# Patient Record
Sex: FEMALE | Race: WHITE
[De-identification: names, ages, dates, MRNs, and addresses within clinical notes are randomized per-mention and may not be internally consistent; named-entity substitution may affect disease eponyms.]

---

## 2022-12-16 ENCOUNTER — HOSPITAL ENCOUNTER (EMERGENCY)
Dept: HOSPITAL 47 - EC | Age: 87
Discharge: ANOTHER HOSPITAL | End: 2022-12-16
Payer: MEDICARE

## 2022-12-16 VITALS — HEART RATE: 60 BPM | RESPIRATION RATE: 18 BRPM | DIASTOLIC BLOOD PRESSURE: 58 MMHG | SYSTOLIC BLOOD PRESSURE: 91 MMHG

## 2022-12-16 DIAGNOSIS — R29.711: ICD-10-CM

## 2022-12-16 DIAGNOSIS — I10: ICD-10-CM

## 2022-12-16 DIAGNOSIS — E11.9: ICD-10-CM

## 2022-12-16 DIAGNOSIS — I63.9: Primary | ICD-10-CM

## 2022-12-16 LAB
ALBUMIN SERPL-MCNC: 4.5 G/DL (ref 3.5–5)
ALP SERPL-CCNC: 81 U/L (ref 38–126)
ALT SERPL-CCNC: 16 U/L (ref 4–34)
ANION GAP SERPL CALC-SCNC: 13 MMOL/L
APTT BLD: 21.4 SEC (ref 22–30)
AST SERPL-CCNC: 21 U/L (ref 14–36)
BASOPHILS # BLD AUTO: 0 K/UL (ref 0–0.2)
BASOPHILS NFR BLD AUTO: 1 %
BUN SERPL-SCNC: 21 MG/DL (ref 7–17)
CALCIUM SPEC-MCNC: 10 MG/DL (ref 8.4–10.2)
CHLORIDE SERPL-SCNC: 108 MMOL/L (ref 98–107)
CO2 SERPL-SCNC: 19 MMOL/L (ref 22–30)
EOSINOPHIL # BLD AUTO: 0.1 K/UL (ref 0–0.7)
EOSINOPHIL NFR BLD AUTO: 3 %
ERYTHROCYTE [DISTWIDTH] IN BLOOD BY AUTOMATED COUNT: 4.78 M/UL (ref 3.8–5.4)
ERYTHROCYTE [DISTWIDTH] IN BLOOD: 12.2 % (ref 11.5–15.5)
GLUCOSE SERPL-MCNC: 132 MG/DL (ref 74–99)
HCT VFR BLD AUTO: 45.1 % (ref 34–46)
HGB BLD-MCNC: 15.6 GM/DL (ref 11.4–16)
INR PPP: 0.9 (ref ?–1.2)
LYMPHOCYTES # SPEC AUTO: 1.7 K/UL (ref 1–4.8)
LYMPHOCYTES NFR SPEC AUTO: 35 %
MCH RBC QN AUTO: 32.7 PG (ref 25–35)
MCHC RBC AUTO-ENTMCNC: 34.7 G/DL (ref 31–37)
MCV RBC AUTO: 94.4 FL (ref 80–100)
MONOCYTES # BLD AUTO: 0.3 K/UL (ref 0–1)
MONOCYTES NFR BLD AUTO: 6 %
NEUTROPHILS # BLD AUTO: 2.6 K/UL (ref 1.3–7.7)
NEUTROPHILS NFR BLD AUTO: 52 %
PLATELET # BLD AUTO: 229 K/UL (ref 150–450)
POTASSIUM SERPL-SCNC: 4 MMOL/L (ref 3.5–5.1)
PROT SERPL-MCNC: 6.8 G/DL (ref 6.3–8.2)
PT BLD: 10 SEC (ref 9–12)
SODIUM SERPL-SCNC: 140 MMOL/L (ref 137–145)
WBC # BLD AUTO: 4.9 K/UL (ref 3.8–10.6)

## 2022-12-16 PROCEDURE — 93005 ELECTROCARDIOGRAM TRACING: CPT

## 2022-12-16 PROCEDURE — 70496 CT ANGIOGRAPHY HEAD: CPT

## 2022-12-16 PROCEDURE — 96374 THER/PROPH/DIAG INJ IV PUSH: CPT

## 2022-12-16 PROCEDURE — 83605 ASSAY OF LACTIC ACID: CPT

## 2022-12-16 PROCEDURE — 99291 CRITICAL CARE FIRST HOUR: CPT

## 2022-12-16 PROCEDURE — 70450 CT HEAD/BRAIN W/O DYE: CPT

## 2022-12-16 PROCEDURE — 80053 COMPREHEN METABOLIC PANEL: CPT

## 2022-12-16 PROCEDURE — 96375 TX/PRO/DX INJ NEW DRUG ADDON: CPT

## 2022-12-16 PROCEDURE — 84484 ASSAY OF TROPONIN QUANT: CPT

## 2022-12-16 PROCEDURE — 36415 COLL VENOUS BLD VENIPUNCTURE: CPT

## 2022-12-16 PROCEDURE — 85610 PROTHROMBIN TIME: CPT

## 2022-12-16 PROCEDURE — 37195 THROMBOLYTIC THERAPY STROKE: CPT

## 2022-12-16 PROCEDURE — 85025 COMPLETE CBC W/AUTO DIFF WBC: CPT

## 2022-12-16 PROCEDURE — 71045 X-RAY EXAM CHEST 1 VIEW: CPT

## 2022-12-16 PROCEDURE — 70498 CT ANGIOGRAPHY NECK: CPT

## 2022-12-16 PROCEDURE — 85730 THROMBOPLASTIN TIME PARTIAL: CPT

## 2022-12-16 NOTE — CT
EXAMINATION TYPE: CT angio head neck

CT DLP: 408.3 mGycm, Automated exposure control for dose reduction was used.

 

DATE OF EXAM: 12/16/2022 5:50 PM

 

COMPARISON: CT head same day..  

 

CLINICAL INDICATION:Female, 87 years old with history of Neuro deficit, acute, stroke suspected; 

 

TECHNIQUE: Axially acquired helical CT angiogram of the head and neck was obtained with contrast. Axi
al images are supplemented with 3D reconstructions which were post-processed at an independent workst
ation. NASCET criteria used.

Contrast used:65 mL of Isovue 370 with IV Contrast, 

Oral contrast used: None. 

 

FINDINGS:

 

CTA HEAD:

No evidence of acute intracranial hemorrhage, mass effect, or midline shift. The ventricles, sulci, a
nd cisterns are unremarkable. 

 

The visualized portions of the internal carotid arteries, right middle cerebral arteries, anterior ce
rebral arteries, and posterior cerebral arteries are patent. 

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 Series 409 image 25.

Fetal origin of the right posterior cerebral artery. Atherosclerosis of the internal carotid arteries
 intracranial portions without significant stenosis.

 

The basilar and vertebral arteries are patent. 

 

CTA NECK:

Right Carotid System: 

The common carotid artery and external carotid artery are patent. The carotid bifurcation demonstrate
s calcified plaque without evidence of hemodynamically significant stenosis. The remaining portions o
f the internal carotid artery demonstrate normal size without significant narrowing.

 

Left Carotid System: 

The common carotid artery and external carotid artery are patent. The carotid bifurcation demonstrate
s calcified plaque without evidence of hemodynamically significant stenosis. The remaining portions o
f the internal carotid artery demonstrate normal size without significant narrowing.

 

Vertebral arteries are patent nares calcified plaque at the origins of the vertebral arteries bloomin
g artifact limits evaluation

 

There is a two-vessel aortic arch, left subclavian artery demonstrates predominately calcified plaque
 at its origin extending along a length of 2.1 cm with 70-95% stenosis. 

 

Findings communicated to Dr. Scooter Holden MD  on 12/16/2022 6:22 PM by Dr. Gil Diamond.

 

IMPRESSION:

1.  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 

2.  No evidence of dissection of the cervical internal carotid arteries or any evidence of significan
t stenosis at the carotid bifurcations. 

3.  No evidence of or intracranial aneurysm. 

4.  Left subclavian artery demonstrates predominately calcified plaque at its origin extending along 
the length of 2.1 cm with 70-95% stenosis. 

5.  Calcified plaque at the origins of the vertebral arteries left greater than right, difficult to e
valuate given blooming artifact from the calcifications.

## 2022-12-16 NOTE — XR
EXAMINATION TYPE: XR chest 1V portable

 

DATE OF EXAM: 12/16/2022 7:29 PM

 

COMPARISON: Chest radiographs from 5/18/2012

 

TECHNIQUE: XR chest 1V portable Portable AP radiograph of the chest.

 

CLINICAL INDICATION:Female, 87 years old with history of altered mental status; 

 

FINDINGS: 

Lungs/Pleura: There is no evidence of pleural effusion, focal consolidation, or pneumothorax.  

Pulmonary vascularity: Unremarkable.

Heart/mediastinum: Cardiomediastinal silhouette is enlarged and stable.

Musculoskeletal: No acute osseous pathology. Midline sternotomy wires are noted.

 

 

IMPRESSION: 

No acute cardiopulmonary disease/process.

## 2022-12-16 NOTE — ED
General Adult HPI





- General


Stated complaint: poss stroke


Time Seen by Provider: 12/16/22 17:21





- History of Present Illness


Initial comments: 





Patient is an 87-year-old female with past medical history remarkable for 

hypertension, diabetes, prior MIs who presents emergency Department over concern

for possible stroke.  Last known well was 1:30 PM this afternoon.  Patient 

cannot provide any history.  Patient's daughter does present with the patient.  

They did have a full conversation with her on the phone at approximately 1:30pm.

 They arrived to her house, she was able to communicate, and was acting 

differently.  Normally patient is alert and oriented 4.  Normal she acts 

normally.  They called EMS immediately.  She presents emergency department at 

approximately 5:20 PM which is when I evaluated her.  She is unable to provide 

any history.  She is alert and looking around but confused.  Not making any 

sense when speaking, and cannot follow any commands.  Is moving all 4 

extremities.  Presents for further evaluation at this time.  No history of blood

thinner use.  No history of intracranial hemorrhage or trauma.  No prior 

strokes.





- Related Data


                                    Allergies











Allergy/AdvReac Type Severity Reaction Status Date / Time


 


No Known Allergies Allergy   Verified 12/16/22 18:33














Review of Systems


ROS Statement: 


Those systems with pertinent positive or pertinent negative responses have been 

documented in the HPI.





ROS Other: All systems not noted in ROS Statement are negative.





General Exam





- General Exam Comments


Initial Comments: 





General: Appears in no acute distress.


HEAD:  Normal with no signs of head trauma.


EYES:  PERRLA, EOMI, conjunctiva normal, no discharge.  Pupils are 3 mm and 

equal bilaterally.


ENT:  Hearing grossly intact, normal oropharynx.


RESPIRATORY:  Clear breath sounds bilaterally.  No wheezes, rales, or rhonchi.  


C/V:  Regular rate and rhythm. S1 and S2 auscultated, no edema, peripheral 

pulses 2+ and intact throughout


ABD:  Abd is soft, nontender, nondistended


EXT: Normal range of motion, no obvious deformity


SKIN:  No rashes or lesions observed on exposed skin.


NEURO: Alert but not oriented.  Aphasic.  Somewhat mute at this time.  Moving 

all 4 extremities.  NIH is 10-11, 3 points for aphasia, 2 points for failing to 

following commands, and 2 points for being unable to respond to month and age, 2

points for dysarthria, and 1-2 points for inattention/hemineglect to the right 

side .  Last known well was 13:30.  GCS of 15.





Course


                                   Vital Signs











  12/16/22 12/16/22 12/16/22





  17:45 17:56 18:08


 


Pulse Rate 55 L 55 L 58 L


 


Respiratory 18 18 18





Rate   


 


Blood Pressure 188/68 152/108 73/49


 


O2 Sat by Pulse 99 99 99





Oximetry   














  12/16/22 12/16/22 12/16/22





  18:10 18:15 18:21


 


Pulse Rate 56 L 59 L 57 L


 


Respiratory 18 18 18





Rate   


 


Blood Pressure 95/61 94/67 110/98


 


O2 Sat by Pulse 98 99 99





Oximetry   














  12/16/22 12/16/22 12/16/22





  18:28 18:37 18:42


 


Pulse Rate 55 L 56 L 65


 


Respiratory 18 18 18





Rate   


 


Blood Pressure 79/50 108/92 119/67


 


O2 Sat by Pulse 99 98 98





Oximetry   














  12/16/22 12/16/22 12/16/22





  18:45 18:50 18:55


 


Pulse Rate 58 L 59 L 56 L


 


Respiratory 16 18 18





Rate   


 


Blood Pressure 92/70 101/75 147/132


 


O2 Sat by Pulse 97 97 97





Oximetry   














  12/16/22 12/16/22





  19:00 19:13


 


Pulse Rate 61 60


 


Respiratory 16 18





Rate  


 


Blood Pressure 94/66 91/58


 


O2 Sat by Pulse 96 98





Oximetry  














Medical Decision Making





- Medical Decision Making





Based on the patient's presentation and physical exam, I'm concerned for a code 

stroke.  Patient was made a code alteplase his last known well was within the 

last 4-1/2 hours.  Last known well was at 1330.  Point-of-care blood sugar is 

within normal limits.  NIH is 11.  Normal baseline is alert and oriented 4, and

independent with ADL's.  We will obtain stroke workup.  Code alteplase was 

activated of over head.  We will obtain stroke labs, as well as imaging.  The 

neuro interventionalist Dr. Newton on-call was notified.  He called back at 

[phone].  We did discuss the patient.  We do believe she is a TPA candidate, as 

long as family understands the increased risk of bleeding as the patient is 

greater than 80 years old.  This is a relative contraindication.  Otherwise he 

was in agreement with the plan.  While I was on the phone with him, he did 

interpret the CT brain without contrast, and saw no intracranial bleed.  CT of 

the brain as interpreted myself without contrast reveals no acute intracranial 

hemorrhage.  I do concur with his findings.  TPA was ordered at this time.





I was able to speak with the patient's son Eugene Vega who eventually 

presented at bedside.  We did discuss risks and benefits of TPA, as well as the 

patient's relative contraindication due to her age.  They expressed 

understanding, and as the patient is independent with her ADLs, and normal 

function and she is significantly below her baseline as she is unable to 

communicate at this time with a high NIH, they would like TPA administered.  

Patient's son, signed consent.  He did express understanding of the risks 

involved including possible bleed and death.  Patient was unable to consent on 

her own to her current clinical status.





Patient's blood pressure did increase while she was in the trauma bay from 

initial presentation.  She was administered 20 mg of IV labetalol.  This did 

decrease her blood pressure to 152/108 which is within acceptable limits for TPA

administration.  TPA was administered at 1757, still within the 4-1/2 hour 

window.  Nicardipine drip was ordered to be on standby in the event that patient

requires better blood pressure control. Goal BP will be SBP <185 and DBP <110.





I was notified by Dr. Diamond that the patient has a left MCA occlusion on CT 

angiogram.  I did reach back out to  at this time. Initially contacted 

at 1825. 





EKG shows no signs of acute ischemia.Patient's laboratory studies are remarkable

for slightly elevated lactic acid of 2.4.  Patient is a slight STACEY as well.  1 L

fluid bolus will be started.





On reevaluation, patient's blood pressure remains within acceptable limits.  

Patient is moving around as she is anxious, which does make her blood pressure 

readings intermittent but when we do hold her arm steady, patient systolics 

ranged between 94 and 120 and diastolics ranged between 66 and 75.





Cardene drip is being held at this time but is hung in the event the patient 

does require it.





Dr. Newton called back at 1851, and reviewed the images.  As the patient is 

having no significant improvement in her NIH, patient is full code, and is 

normally very independent with her ADLs, ambulates with a cane at baseline.  We 

discussed with family and patient is a thrombectomy candidate.  Patient will be 

transferred to [facility] in serious condition for thrombectomy 

evaluation.  Dr. Newton who accepted the patient. I spoke with Dr. Jackson at 

[facility] who accepted the transfer.





I instructed EMS the parameters for starting nicardipine for blood pressure 

systolics greater than 185 or/and diastolics greater than 110.





I discussed the case fully with family members who were in agreement with the 

plan. Patient was transferred in serious condition.





- Lab Data


Result diagrams: 


                                 12/16/22 18:08





                                 12/16/22 18:08


                                   Lab Results











  12/16/22 12/16/22 12/16/22 Range/Units





  18:08 18:08 18:08 


 


WBC  4.9    (3.8-10.6)  k/uL


 


RBC  4.78    (3.80-5.40)  m/uL


 


Hgb  15.6    (11.4-16.0)  gm/dL


 


Hct  45.1    (34.0-46.0)  %


 


MCV  94.4    (80.0-100.0)  fL


 


MCH  32.7    (25.0-35.0)  pg


 


MCHC  34.7    (31.0-37.0)  g/dL


 


RDW  12.2    (11.5-15.5)  %


 


Plt Count  229    (150-450)  k/uL


 


MPV  7.3    


 


Neutrophils %  52    %


 


Lymphocytes %  35    %


 


Monocytes %  6    %


 


Eosinophils %  3    %


 


Basophils %  1    %


 


Neutrophils #  2.6    (1.3-7.7)  k/uL


 


Lymphocytes #  1.7    (1.0-4.8)  k/uL


 


Monocytes #  0.3    (0-1.0)  k/uL


 


Eosinophils #  0.1    (0-0.7)  k/uL


 


Basophils #  0.0    (0-0.2)  k/uL


 


PT   10.0   (9.0-12.0)  sec


 


INR   0.9   (<1.2)  


 


APTT   21.4 L   (22.0-30.0)  sec


 


Sodium    140  (137-145)  mmol/L


 


Potassium    4.0  (3.5-5.1)  mmol/L


 


Chloride    108 H  ()  mmol/L


 


Carbon Dioxide    19 L  (22-30)  mmol/L


 


Anion Gap    13  mmol/L


 


BUN    21 H  (7-17)  mg/dL


 


Creatinine    1.18 H  (0.52-1.04)  mg/dL


 


Est GFR (CKD-EPI)AfAm    49  (>60 ml/min/1.73 sqM)  


 


Est GFR (CKD-EPI)NonAf    42  (>60 ml/min/1.73 sqM)  


 


Glucose    132 H  (74-99)  mg/dL


 


Lactic Ac Sepsis Rflx     


 


Plasma Lactic Acid Bon     (0.7-2.0)  mmol/L


 


Calcium    10.0  (8.4-10.2)  mg/dL


 


Total Bilirubin    0.5  (0.2-1.3)  mg/dL


 


AST    21  (14-36)  U/L


 


ALT    16  (4-34)  U/L


 


Alkaline Phosphatase    81  ()  U/L


 


Troponin I     (0.000-0.034)  ng/mL


 


Total Protein    6.8  (6.3-8.2)  g/dL


 


Albumin    4.5  (3.5-5.0)  g/dL














  12/16/22 12/16/22 12/16/22 Range/Units





  18:08 18:08 18:37 


 


WBC     (3.8-10.6)  k/uL


 


RBC     (3.80-5.40)  m/uL


 


Hgb     (11.4-16.0)  gm/dL


 


Hct     (34.0-46.0)  %


 


MCV     (80.0-100.0)  fL


 


MCH     (25.0-35.0)  pg


 


MCHC     (31.0-37.0)  g/dL


 


RDW     (11.5-15.5)  %


 


Plt Count     (150-450)  k/uL


 


MPV     


 


Neutrophils %     %


 


Lymphocytes %     %


 


Monocytes %     %


 


Eosinophils %     %


 


Basophils %     %


 


Neutrophils #     (1.3-7.7)  k/uL


 


Lymphocytes #     (1.0-4.8)  k/uL


 


Monocytes #     (0-1.0)  k/uL


 


Eosinophils #     (0-0.7)  k/uL


 


Basophils #     (0-0.2)  k/uL


 


PT     (9.0-12.0)  sec


 


INR     (<1.2)  


 


APTT     (22.0-30.0)  sec


 


Sodium     (137-145)  mmol/L


 


Potassium     (3.5-5.1)  mmol/L


 


Chloride     ()  mmol/L


 


Carbon Dioxide     (22-30)  mmol/L


 


Anion Gap     mmol/L


 


BUN     (7-17)  mg/dL


 


Creatinine     (0.52-1.04)  mg/dL


 


Est GFR (CKD-EPI)AfAm     (>60 ml/min/1.73 sqM)  


 


Est GFR (CKD-EPI)NonAf     (>60 ml/min/1.73 sqM)  


 


Glucose     (74-99)  mg/dL


 


Lactic Ac Sepsis Rflx    Y  


 


Plasma Lactic Acid Bon   2.4 H*   (0.7-2.0)  mmol/L


 


Calcium     (8.4-10.2)  mg/dL


 


Total Bilirubin     (0.2-1.3)  mg/dL


 


AST     (14-36)  U/L


 


ALT     (4-34)  U/L


 


Alkaline Phosphatase     ()  U/L


 


Troponin I  <0.012    (0.000-0.034)  ng/mL


 


Total Protein     (6.3-8.2)  g/dL


 


Albumin     (3.5-5.0)  g/dL














- EKG Data


-: EKG Interpreted by Me


EKG Comments: 





12-lead Electrocardiogram Interpretation Note





EKG was reviewed and interpreted by myself. 12-lead ECG performed at 1823 is 

interpreted by me as revealing [normal sinus rhythm] at a rate of 55 beats per 

minute.  Axis is normal.  WV interval is 196 ms, QRS duration is 93 ms, QTc is 

443 ms..  There is an isolated T-wave inversion in lead III.  PVCs present.  No 

other ST segment or T-wave abnormalities to suggest acute ischemia.. [R wave 

progression across the precordium was satisfactory]. [By my interpretation this 

EKG is non-diagnostic for acute ischemia].  No prior EKG for comparison.





Critical Care Time


Critical Care Time: Yes


Total Critical Care Time: 35


Critical Care Time: 





Upon my evaluation, this patient had a high probability of imminent or life-

threatening deterioration due to CVA status post TPA, transfer for thrombectomy,

which required my direct attention, intervention, and personal management. 


I have personally provided 35 minutes of critical care time exclusive of time 

spent on separately billable procedures. Time includes review of laboratory 

data, radiology results, discussion with consultants, and monitoring for potenti

al decompensation. Interventions were performed as documented in my note.





Disposition


Clinical Impression: 


 Cerebrovascular accident (CVA), Hypertension





Disposition: OTHER INSTITUTION NOT DEFINED


Condition: Serious


Referrals: 


Bentley Lee DO [Primary Care Provider] - 1-2 days


Time of Disposition: 19:00





- Out of Hospital Transfer - Req. Specs


Out of Hospital Transfer - Requested Specifics: Other Emergency Center (Transfer

for thrombectomy evaluation for CVA)

## 2022-12-16 NOTE — CT
EXAMINATION TYPE: CT brain wo con for TPA

CT DLP: 1118.6 mGycm, Automated exposure control for dose reduction was used.

 

DATE OF EXAM: 12/16/2022 5:33 PM

 

COMPARISON: None.

 

CLINICAL INDICATION:Female, 87 years old with history of Neuro deficit, acute, stroke suspected, Code
 stroke

 

TECHNIQUE: 

Brain: Axial CT images of the brain were obtained with coronal and sagittal reformats created and rev
iewed.

Contrast used: None.

Oral contrast used: None.

 

FINDINGS:

 

Brain:

Extra-axial spaces: No abnormal extra-axial fluid collections.

Ventricular system: Within normal limits

Cerebral parenchyma: No acute intraparenchymal hemorrhage or mass effect.  The gray-white junction is
 well differentiated. 

Cerebellum: Unremarkable.

Mass effect: No evidence of midline shift.

Intracranial vasculature: unremarkable

Soft tissues: Normal.

Calvarium/osseous structures: No depressed skull fracture.

Paranasal sinuses and mastoid air cells: Mild scattered paranasal sinus disease.

Visualized orbits: Orbital contents are intact.

 

Findings attempted to be communicated to Dr. Scooter Holden MD  on 12/16/2022 5:37 PM by Dr. Jacek Diamond.

 

IMPRESSION:

No acute intracranial process.

## 2024-12-27 ENCOUNTER — HOSPITAL ENCOUNTER (INPATIENT)
Dept: HOSPITAL 47 - EC | Age: 89
LOS: 7 days | Discharge: NURSING, REHAB OR LONG-TERM CARE | DRG: 65 | End: 2025-01-03
Attending: INTERNAL MEDICINE | Admitting: INTERNAL MEDICINE
Payer: MEDICARE

## 2024-12-27 VITALS — BODY MASS INDEX: 22.9 KG/M2

## 2024-12-27 DIAGNOSIS — Z79.82: ICD-10-CM

## 2024-12-27 DIAGNOSIS — I70.203: ICD-10-CM

## 2024-12-27 DIAGNOSIS — I10: ICD-10-CM

## 2024-12-27 DIAGNOSIS — E11.51: ICD-10-CM

## 2024-12-27 DIAGNOSIS — Z79.899: ICD-10-CM

## 2024-12-27 DIAGNOSIS — I63.9: Primary | ICD-10-CM

## 2024-12-27 DIAGNOSIS — R29.713: ICD-10-CM

## 2024-12-27 DIAGNOSIS — F05: ICD-10-CM

## 2024-12-27 DIAGNOSIS — Z79.84: ICD-10-CM

## 2024-12-27 DIAGNOSIS — I69.320: ICD-10-CM

## 2024-12-27 DIAGNOSIS — Z95.1: ICD-10-CM

## 2024-12-27 DIAGNOSIS — I65.21: ICD-10-CM

## 2024-12-27 DIAGNOSIS — G81.91: ICD-10-CM

## 2024-12-27 DIAGNOSIS — E78.5: ICD-10-CM

## 2024-12-27 DIAGNOSIS — Z53.09: ICD-10-CM

## 2024-12-27 DIAGNOSIS — F01.52: ICD-10-CM

## 2024-12-27 DIAGNOSIS — I49.3: ICD-10-CM

## 2024-12-27 DIAGNOSIS — G93.40: ICD-10-CM

## 2024-12-27 DIAGNOSIS — I70.8: ICD-10-CM

## 2024-12-27 DIAGNOSIS — I25.10: ICD-10-CM

## 2024-12-27 DIAGNOSIS — R47.02: ICD-10-CM

## 2024-12-27 DIAGNOSIS — I82.431: ICD-10-CM

## 2024-12-27 DIAGNOSIS — I25.2: ICD-10-CM

## 2024-12-27 DIAGNOSIS — I44.0: ICD-10-CM

## 2024-12-27 DIAGNOSIS — Z87.891: ICD-10-CM

## 2024-12-27 DIAGNOSIS — N28.9: ICD-10-CM

## 2024-12-27 LAB
ALBUMIN SERPL-MCNC: 4.5 G/DL (ref 3.5–5)
ALP SERPL-CCNC: 90 U/L (ref 38–126)
ALT SERPL-CCNC: 16 U/L (ref 4–34)
ANION GAP SERPL CALC-SCNC: 9 MMOL/L
APTT BLD: 19.5 SEC (ref 22–30)
AST SERPL-CCNC: 22 U/L (ref 14–36)
BASOPHILS # BLD AUTO: 0 K/UL (ref 0–0.2)
BASOPHILS NFR BLD AUTO: 1 %
BUN SERPL-SCNC: 24 MG/DL (ref 7–17)
CALCIUM SPEC-MCNC: 9.5 MG/DL (ref 8.4–10.2)
CHLORIDE SERPL-SCNC: 110 MMOL/L (ref 98–107)
CK SERPL-CCNC: 179 U/L (ref 30–135)
CO2 SERPL-SCNC: 23 MMOL/L (ref 22–30)
EOSINOPHIL # BLD AUTO: 0.1 K/UL (ref 0–0.7)
EOSINOPHIL NFR BLD AUTO: 2 %
ERYTHROCYTE [DISTWIDTH] IN BLOOD BY AUTOMATED COUNT: 4.25 M/UL (ref 3.8–5.4)
ERYTHROCYTE [DISTWIDTH] IN BLOOD: 12.5 % (ref 11.5–15.5)
GLUCOSE BLD-MCNC: 108 MG/DL (ref 70–110)
GLUCOSE SERPL-MCNC: 133 MG/DL (ref 74–99)
HCT VFR BLD AUTO: 40.3 % (ref 34–46)
HGB BLD-MCNC: 13.3 GM/DL (ref 11.4–16)
INR PPP: 0.9 (ref ?–1.2)
LYMPHOCYTES # SPEC AUTO: 1.6 K/UL (ref 1–4.8)
LYMPHOCYTES NFR SPEC AUTO: 27 %
MCH RBC QN AUTO: 31.4 PG (ref 25–35)
MCHC RBC AUTO-ENTMCNC: 33.1 G/DL (ref 31–37)
MCV RBC AUTO: 94.8 FL (ref 80–100)
MONOCYTES # BLD AUTO: 0.4 K/UL (ref 0–1)
MONOCYTES NFR BLD AUTO: 7 %
NEUTROPHILS # BLD AUTO: 3.6 K/UL (ref 1.3–7.7)
NEUTROPHILS NFR BLD AUTO: 60 %
PH UR: 6 [PH] (ref 5–8)
PLATELET # BLD AUTO: 281 K/UL (ref 150–450)
POTASSIUM SERPL-SCNC: 3.7 MMOL/L (ref 3.5–5.1)
PROT SERPL-MCNC: 6.8 G/DL (ref 6.3–8.2)
PT BLD: 10.5 SEC (ref 10–12.5)
SODIUM SERPL-SCNC: 142 MMOL/L (ref 137–145)
SP GR UR: 1.01 (ref 1–1.03)
UROBILINOGEN UR QL STRIP: <2 MG/DL (ref ?–2)
WBC # BLD AUTO: 5.9 K/UL (ref 3.8–10.6)

## 2024-12-27 PROCEDURE — 70496 CT ANGIOGRAPHY HEAD: CPT

## 2024-12-27 PROCEDURE — 96376 TX/PRO/DX INJ SAME DRUG ADON: CPT

## 2024-12-27 PROCEDURE — 93005 ELECTROCARDIOGRAM TRACING: CPT

## 2024-12-27 PROCEDURE — 70450 CT HEAD/BRAIN W/O DYE: CPT

## 2024-12-27 PROCEDURE — 96372 THER/PROPH/DIAG INJ SC/IM: CPT

## 2024-12-27 PROCEDURE — 70498 CT ANGIOGRAPHY NECK: CPT

## 2024-12-27 PROCEDURE — 80053 COMPREHEN METABOLIC PANEL: CPT

## 2024-12-27 PROCEDURE — 85025 COMPLETE CBC W/AUTO DIFF WBC: CPT

## 2024-12-27 PROCEDURE — 85730 THROMBOPLASTIN TIME PARTIAL: CPT

## 2024-12-27 PROCEDURE — 82550 ASSAY OF CK (CPK): CPT

## 2024-12-27 PROCEDURE — 93306 TTE W/DOPPLER COMPLETE: CPT

## 2024-12-27 PROCEDURE — 80061 LIPID PANEL: CPT

## 2024-12-27 PROCEDURE — 80048 BASIC METABOLIC PNL TOTAL CA: CPT

## 2024-12-27 PROCEDURE — 71045 X-RAY EXAM CHEST 1 VIEW: CPT

## 2024-12-27 PROCEDURE — 36415 COLL VENOUS BLD VENIPUNCTURE: CPT

## 2024-12-27 PROCEDURE — 84484 ASSAY OF TROPONIN QUANT: CPT

## 2024-12-27 PROCEDURE — 85610 PROTHROMBIN TIME: CPT

## 2024-12-27 PROCEDURE — 70551 MRI BRAIN STEM W/O DYE: CPT

## 2024-12-27 PROCEDURE — 99291 CRITICAL CARE FIRST HOUR: CPT

## 2024-12-27 PROCEDURE — 81003 URINALYSIS AUTO W/O SCOPE: CPT

## 2024-12-27 PROCEDURE — 96375 TX/PRO/DX INJ NEW DRUG ADDON: CPT

## 2024-12-27 PROCEDURE — 93970 EXTREMITY STUDY: CPT

## 2024-12-27 PROCEDURE — 83036 HEMOGLOBIN GLYCOSYLATED A1C: CPT

## 2024-12-27 PROCEDURE — 96374 THER/PROPH/DIAG INJ IV PUSH: CPT

## 2024-12-27 PROCEDURE — 96361 HYDRATE IV INFUSION ADD-ON: CPT

## 2024-12-27 RX ADMIN — ASPIRIN 325 MG ORAL TABLET STA: 325 PILL ORAL at 21:50

## 2024-12-27 RX ADMIN — HALOPERIDOL LACTATE STA MG: 5 INJECTION, SOLUTION INTRAMUSCULAR at 20:17

## 2024-12-27 RX ADMIN — ATORVASTATIN CALCIUM SCH: 80 TABLET, FILM COATED ORAL at 21:51

## 2024-12-27 RX ADMIN — CEFAZOLIN SCH MLS/HR: 330 INJECTION, POWDER, FOR SOLUTION INTRAMUSCULAR; INTRAVENOUS at 21:53

## 2024-12-27 RX ADMIN — CEFAZOLIN ONE MLS/HR: 330 INJECTION, POWDER, FOR SOLUTION INTRAMUSCULAR; INTRAVENOUS at 18:55

## 2024-12-27 RX ADMIN — TICAGRELOR SCH: 90 TABLET ORAL at 21:50

## 2024-12-27 NOTE — ED
Neuro HPI





- General


Chief Complaint: Neuro Symptoms/Deficit


Stated Complaint: Stroke


Time Seen by Provider: 12/27/24 18:00


Source: EMS


Mode of arrival: EMS


Limitations: altered mental status





- History of Present Illness


Is the patient presenting with stroke symptoms?: Yes


Initial Comments: 


89-year-old female presents to the emergency department with strokelike 

symptoms.  Patient was dropped off at her house by friends around 1 PM and was 

doing fine.  Son then called around 4 PM and realized that she was confused and 

having expressive aphasia.  Patient does have a history of stroke 2 years ago.  

She received alteplase and was transferred to [facility] where they 

attempted thrombectomy.  This was not successful as patient had femoral 

occlusions.  Patient ended up improving on her own.  Son states that she has had

some issues with her memory however speech was mostly back to normal and she had

no lateralizing weakness in her extremities.  She is currently not on any blood 

thinners.  There was no known trauma.  EMS picked the patient up and thought 

that she had some right-sided weakness.  There was no facial droop.  Patient 

cannot follow any commands or answer any questions upon arrival.  Her response 

to every question is "okay".  Patient does not appear to be neglecting 1 side.  

Remainder of HPI is limited





- Related Data


Home Medications: 


                                Home Medications











 Medication  Instructions  Recorded  Confirmed


 


Aspirin EC [Ecotrin Low Dose] 81 mg PO DAILY 12/28/24 12/28/24


 


Atorvastatin [Lipitor] 80 mg PO DAILY 12/28/24 12/28/24


 


Ezetimibe [Zetia] 10 mg PO DAILY 12/28/24 12/28/24


 


Losartan Potassium [Cozaar] 100 mg PO DAILY 12/28/24 12/28/24


 


Metoprolol Succinate (ER) [Toprol 25 mg PO DAILY 12/28/24 12/28/24





Xl]   


 


Spironolactone [Aldactone] 25 mg PO DAILY 12/28/24 12/28/24


 


amLODIPine [Norvasc] 5 mg PO DAILY 12/28/24 12/28/24


 


hydroCHLOROthiazide [Hydrodiuril] 25 mg PO DAILY 12/28/24 12/28/24


 


metFORMIN HCL [Glucophage] 1,000 mg PO BID 12/28/24 12/28/24











Allergies/Adverse Reactions: 


                                    Allergies











Allergy/AdvReac Type Severity Reaction Status Date / Time


 


No Known Allergies Allergy   Verified 12/27/24 17:49














Review of Systems


ROS Statement: 


Those systems with pertinent positive or pertinent negative responses have been 

documented in the HPI.





ROS Other: All systems not noted in ROS Statement are negative.





General Exam


Limitations: altered mental status


General appearance: alert, anxious


Head exam: Present: atraumatic, normocephalic, normal inspection


Eye exam: Present: normal appearance, PERRL, EOMI.  Absent: scleral icterus, 

conjunctival injection, periorbital swelling


ENT exam: Present: normal exam, mucous membranes moist


Neck exam: Present: normal inspection.  Absent: tenderness, meningismus, 

lymphadenopathy


Respiratory exam: Present: normal lung sounds bilaterally.  Absent: respiratory 

distress, wheezes, rales, rhonchi, stridor


Cardiovascular Exam: Present: regular rate, normal rhythm, normal heart sounds. 

 Absent: systolic murmur, diastolic murmur, rubs, gallop, clicks


GI/Abdominal exam: Present: soft, normal bowel sounds.  Absent: distended, 

tenderness, guarding, rebound, rigid


Extremities exam: Present: other (Patient originally appears to have some 

right-sided weakness)


Neurological exam: Present: altered, other (No facial droop.  Patient does not 

follow commands.  Patient has expressive aphasia with mild dysarthria)


Psychiatric exam: Present: agitated


Skin exam: Present: warm, dry, intact, normal color.  Absent: rash





Stroke MDM





- Lab Data


Result diagrams: 


                                 12/27/24 17:54





                                 12/27/24 22:06


                                   Lab Results











  12/27/24 12/27/24 12/27/24 Range/Units





  17:50 17:51 17:54 


 


WBC    5.9  (3.8-10.6)  k/uL


 


RBC    4.25  (3.80-5.40)  m/uL


 


Hgb    13.3  (11.4-16.0)  gm/dL


 


Hct    40.3  (34.0-46.0)  %


 


MCV    94.8  (80.0-100.0)  fL


 


MCH    31.4  (25.0-35.0)  pg


 


MCHC    33.1  (31.0-37.0)  g/dL


 


RDW    12.5  (11.5-15.5)  %


 


Plt Count    281  (150-450)  k/uL


 


MPV    7.2  


 


Neutrophils %    60  %


 


Lymphocytes %    27  %


 


Monocytes %    7  %


 


Eosinophils %    2  %


 


Basophils %    1  %


 


Neutrophils #    3.6  (1.3-7.7)  k/uL


 


Lymphocytes #    1.6  (1.0-4.8)  k/uL


 


Monocytes #    0.4  (0-1.0)  k/uL


 


Eosinophils #    0.1  (0-0.7)  k/uL


 


Basophils #    0.0  (0-0.2)  k/uL


 


PT     (10.0-12.5)  sec


 


INR     (<1.2)  


 


APTT     (22.0-30.0)  sec


 


POC Glucose (mg/dL)   108   ()  mg/dL


 


POC Glu Operater ID   Samira Martin   


 


Troponin I     (0.000-0.034)  ng/mL


 


Urine Color  Colorless    


 


Urine Appearance  Clear    (Clear)  


 


Urine pH  6.0    (5.0-8.0)  


 


Ur Specific Gravity  1.006    (1.001-1.035)  


 


Urine Protein  Negative    (Negative)  


 


Urine Glucose (UA)  Negative    (Negative)  


 


Urine Ketones  Negative    (Negative)  


 


Urine Blood  Negative    (Negative)  


 


Urine Nitrite  Negative    (Negative)  


 


Urine Bilirubin  Negative    (Negative)  


 


Urine Urobilinogen  <2.0    (<2.0)  mg/dL


 


Ur Leukocyte Esterase  Negative    (Negative)  














  12/27/24 12/27/24 Range/Units





  17:54 17:54 


 


WBC    (3.8-10.6)  k/uL


 


RBC    (3.80-5.40)  m/uL


 


Hgb    (11.4-16.0)  gm/dL


 


Hct    (34.0-46.0)  %


 


MCV    (80.0-100.0)  fL


 


MCH    (25.0-35.0)  pg


 


MCHC    (31.0-37.0)  g/dL


 


RDW    (11.5-15.5)  %


 


Plt Count    (150-450)  k/uL


 


MPV    


 


Neutrophils %    %


 


Lymphocytes %    %


 


Monocytes %    %


 


Eosinophils %    %


 


Basophils %    %


 


Neutrophils #    (1.3-7.7)  k/uL


 


Lymphocytes #    (1.0-4.8)  k/uL


 


Monocytes #    (0-1.0)  k/uL


 


Eosinophils #    (0-0.7)  k/uL


 


Basophils #    (0-0.2)  k/uL


 


PT  10.5   (10.0-12.5)  sec


 


INR  0.9   (<1.2)  


 


APTT  19.5 L   (22.0-30.0)  sec


 


POC Glucose (mg/dL)    ()  mg/dL


 


POC Glu Operater ID    


 


Troponin I   0.013  (0.000-0.034)  ng/mL


 


Urine Color    


 


Urine Appearance    (Clear)  


 


Urine pH    (5.0-8.0)  


 


Ur Specific Gravity    (1.001-1.035)  


 


Urine Protein    (Negative)  


 


Urine Glucose (UA)    (Negative)  


 


Urine Ketones    (Negative)  


 


Urine Blood    (Negative)  


 


Urine Nitrite    (Negative)  


 


Urine Bilirubin    (Negative)  


 


Urine Urobilinogen    (<2.0)  mg/dL


 


Ur Leukocyte Esterase    (Negative)  














- Medical Decision Making


Was pt. sent in by a medical professional or institution (, PA, NP, urgent 

care, hospital, or nursing home...) When possible be specific


@  -No


Did you speak to anyone other than the patient for history (EMS, parent, family,

 police, friend...)? What history was obtained from this source 


@  -Spoke with EMS and son for history


Did you review nursing and triage notes (agree or disagree)?  Why? 


@  -I reviewed and agree with nursing and triage notes


Were old charts reviewed (outside hosp., previous admission, EMS record, old 

EKG, old radiological studies, urgent care reports/EKG's, nursing home records)?

 Report findings 


@  -I reviewed the patient's chart from 2 years ago where she was seen in our 

emergency department for same complaint.  Patient received tPA and was 

transferred to [facility] for thrombectomy


Differential Diagnosis (chest pain, altered mental status, abdominal pain women,

 abdominal pain men, vaginal bleeding, weakness, fever, dyspnea, syncope, 

headache, dizziness, GI bleed, back pain, seizure, CVA, palpatations, mental 

health, musculoskeletal)? 


@  -Differential CVA


Ischemic stroke, hemorrhagic stroke, brain tumor, atypical migraine, Wernicke's 

encephalopathy, seizure, multiple sclerosis, meningitis, encephalitis, 

hypoglycemia, Guillain-Barr, electrolytes disturbance, myasthenia gravis.... 

This is not meant to be an all-inclusive list


EKG interpreted by me (3pts min.).


@  -Yes and demonstrates sinus bradycardia with a rate of 55.  MT interval 222. 

 .  QTc of 431.  There are some PACs.  No acute ST segment elevations


X-rays interpreted by me (1pt min.).


@  -Yes and demonstrates no acute process


CT interpreted by me (1pt min.).


@  -Yes and demonstrates acute on chronic occlusion of the left M2 segment


U/S interpreted by me (1pt. min.).


@  -None done


What testing was considered but not performed or refused? (CT, X-rays, U/S, 

labs)? Why?


@  -None


What meds were considered but not given or refused? Why?


@  -None


Did you discuss the management of the patient with other professionals 

(professionals i.e. DrJenny, PA, NP, lab, RT, psych nurse, , , 

teacher, , )? Give summary


@  -I spoke with Dr. Baires -the last time the patient had thrombectomy it 

failed and also the stroke is acute on chronic.  He does not recommend 

intervention at this time the patient is outside the window for TNKase


Was smoking cessation discussed for >3mins.?


@  -No


Was critical care preformed (if so, how long)?


@  -Yes, 35 minutes for management of code stroke


Were there social determinants of health that impacted care today? How? 

(Homelessness, low income, unemployed, alcoholism, drug addiction, transportat

ion, low edu. Level, literacy, decrease access to med. care, senior living, rehab)?


@  -No


Was there de-escalation of care discussed even if they declined (Discuss DNR or 

withdrawal of care, Hospice)? DNR status


@  -No


What co-morbidities impacted this encounter? (DM, HTN, Smoking, COPD, CAD, 

Cancer, CVA, ARF, Chemo, Hep., AIDS, mental health diagnosis, sleep apnea, 

morbid obesity)?


@  -CVA


Was patient admitted / discharged? Hospital course, mention meds given and 

route, prescriptions, significant lab abnormalities, going to OR and other 

pertinent info.


@  -Upon arrival patient seen and evaluated in trauma 1.  She cannot provide any

 history.  NIH is scored and patient does receive a 13.  Last known well was 1 

PM in the afternoon.  She is outside the window for TNKase.  Code stroke was 

activated.  Patient does go for CT and CT angiography.  The results were 

reviewed with Dr. Baires.  He is able to look at the patient's history.  He did

 attempt to intervene on the patient 2 years ago.  States he was unsuccessful 

with thrombectomy due to her peripheral vascular disease in her femoral 

arteries.  He states that her occlusion is acute on chronic.  Does recommend 

increasing her blood pressure.  Recommends aspirin and Brilinta administration. 

 Patient is not a surgical candidate according to Dr. Baires.  Patient will be 

admitted with neurology to consult.  Awaiting Dr. Avendano's return phone call at 

this time to be notified of admission


Undiagnosed new problem with uncertain prognosis?


@  -Yes


Drug Therapy requiring intensive monitoring for toxicity (Heparin, Nitro, 

Insulin, Cardizem)?


@  -No


Were any procedures done?


@  -No


Diagnosis/symptom?


@  -Acute encephalopathy, acute expressive aphasia, acute on chronic CVA


Acute, or Chronic, or Acute on Chronic?


@  -Acute, acute, acute on chronic


Uncomplicated (without systemic symptoms) or Complicated (systemic symptoms)?


@  -Complicated


Side effects of treatment?


@  -No


Exacerbation, Progression, or Severe Exacerbation?


@  -No


Poses a threat to life or bodily function? How? (Chest pain, USA, MI, pneumonia,

 PE, COPD, DKA, ARF, appy, cholecystitis, CVA, Diverticulitis, Homicidal, 

Suicidal, threat to staff... and all critical care pts)


@  -Yes as patient does demonstrate signs of stroke








Past Medical History


Past Medical History: Diabetes Mellitus, Hyperlipidemia, Hypertension, 

Myocardial Infarction (MI)


Past Surgical History: Coronary Bypass/CABG


Past Psychological History: No Psychological Hx Reported


Smoking Status: Former smoker


Past Alcohol Use History: None Reported


Past Drug Use History: None Reported





Course


                                   Vital Signs











  12/27/24 12/27/24 12/27/24





  17:45 18:05 18:20


 


Temperature 98.2 F 98.2 F 98.0 F


 


Pulse Rate 56 L 61 61


 


Respiratory 18 22 22





Rate   


 


Blood Pressure 117/77 84/69 89/61


 


O2 Sat by Pulse 99 99 95





Oximetry   














  12/27/24 12/27/24 12/27/24





  18:35 18:50 21:00


 


Temperature 98.0 F 97.9 F 


 


Pulse Rate 58 L 62 86


 


Respiratory 22 20 20





Rate   


 


Blood Pressure 113/46 111/77 193/80


 


O2 Sat by Pulse 98 98 94 L





Oximetry   














  12/27/24 12/28/24 12/28/24





  22:15 00:00 02:50


 


Temperature   


 


Pulse Rate 72 74 70


 


Respiratory 18 18 18





Rate   


 


Blood Pressure 165/68 146/63 155/60


 


O2 Sat by Pulse 93 L 94 L 97





Oximetry   














  12/28/24 12/28/24 12/28/24





  06:00 10:19 20:00


 


Temperature  97.4 F L 


 


Pulse Rate 55 L 56 L 75


 


Respiratory 16 18 18





Rate   


 


Blood Pressure 141/56 119/55 143/58


 


O2 Sat by Pulse 96 97 95





Oximetry   














Disposition


Clinical Impression: 


 Cerebrovascular accident (CVA), Acute encephalopathy, Expressive aphasia





Disposition: ADMITTED AS IP TO THIS [facility]


Condition: Serious


Is patient prescribed a controlled substance at d/c from ED?: No


Time of Disposition: 20:25


Decision to Admit Reason: Admit from EC


Decision Date: 12/27/24


Decision Time: 20:25

## 2024-12-27 NOTE — CT
EXAMINATION TYPE: CODE STROKE: CT brain wo contr

 

DATE OF EXAM: 12/27/2024 6:04 PM

 

COMPARISON: Previous CT head study dated 12/16/2022..

 

CLINICAL INDICATION: Female, 89 years old with history of Neuro deficit, acute, stroke suspected, Cod
e stroke. Expressive aphasia, doesn't follow commands.

 

TECHNIQUE: 

Brain: Axial CT images of the brain were obtained with coronal and sagittal reformats created and rev
iewed.

Contrast used: None.

Oral contrast used: None.

CT DLP: 1041.6 mGycm, Automated exposure control for dose reduction was used.

 

FINDINGS:

 

Brain:

No acute intracranial hemorrhage, midline shift or significant mass effect. Ventricles and sulci are 
prominent compatible generalized cerebral volume loss. Patchy periventricular and subcortical white m
atter hypoattenuation likely reflecting chronic microvascular ischemic disease. Additionally, there i
s ill-defined hypoattenuation in the left parietal lobe, possibly due to old infarction. Basal cister
ns appear patent. No sizable extra-axial fluid collection. Previous bilateral cataract lens extractio
n noted. Mastoid air cells and paranasal sinuses appear patent. No depressed calvarial fracture or si
gnificant scalp hematoma.

 

IMPRESSION:

No acute intracranial hemorrhage, midline shift or significant mass effect.

 

[facility], Workstation: [id number], 12/27/2024 6:11 PM

## 2024-12-27 NOTE — XR
EXAM:

  XR Chest, 1 View

 

CLINICAL HISTORY:

  ITS.REASON XR Reason: altered mental status

 

TECHNIQUE:

  Frontal view of the chest.

 

COMPARISON:

  12/16/2022

 

FINDINGS:

  Lungs:  No consolidation. No overt edema.

  Pleural space:  No pleural effusion. No pneumothorax.

  Heart:  Unremarkable.  No cardiomegaly.

 

IMPRESSION:     

  No acute cardiopulmonary abnormality.

## 2024-12-27 NOTE — CT
EXAMINATION TYPE: CT angio head neck

 

DATE OF EXAM: 12/27/2024 6:17 PM

 

COMPARISON: Previous CT angiogram study dated 12/16/2022.  

 

CLINICAL INDICATION: Female, 89 years old with history of Neuro deficit, acute, stroke suspected; PHH
, Code stroke. Expressive aphasia, doesnt follow commands.

 

TECHNIQUE: Axially acquired helical CT angiogram of the head and neck was obtained with contrast. Axi
al images are supplemented with 3D reconstructions  and MIP images which were post-processed at an in
dependent workstation. NASCET criteria used.

Contrast used:65 ml mL of Isovue 370 with IV Contrast, 

Oral contrast used: None. 

CT DLP: 337.2 mGycm, Automated exposure control for dose reduction was used.

 

FINDINGS:

Overall, study is significantly limited due to motion artifact.

 

CTA HEAD:

No evidence of acute intracranial hemorrhage, mass effect, or midline shift. The ventricles, sulci, a
nd cisterns are unremarkable. 

 

Intracranial carotid arteries, right middle cerebral arteries and anterior cerebral arteries are dwyer
nt. Posterior cerebral arteries appear patent. Fetal origin of the right posterior cerebral artery ag
ain noted. There is complete occlusion of the left M2 segment of the middle cerebral artery within th
e sylvian fissure (axial series 406 image 37) which could be acute or chronic and appears similar to 
prior study 12/16/2022.

 

Basilar and vertebral arteries are patent.

 

CTA NECK:

Right Carotid System: 

The common carotid artery and external carotid artery are patent. The carotid bifurcation demonstrate
s calcified at the carotid plaque causing approximately 60-70% stenosis of the proximal ICA. The neptali
ining portions of the internal carotid artery demonstrate normal size without significant narrowing.

 

Left Carotid System: 

The common carotid artery and external carotid artery are patent. The carotid bifurcation demonstrate
s calcified atherosclerotic plaque causing approximately 50-60% stenosis. The remaining portions of t
he internal carotid artery demonstrate normal size without significant narrowing.

 

Vertebral arteries are patent without evidence hemodynamically significant stenosis. Left dominant ve
rtebral artery.

 

Common origin of the right brachiocephalic and left common carotid artery is with mild narrowing at t
he origin due to dense calcified plaque. High-grade approximately 80-90% stenosis of the proximal lef
t subclavian artery due to dense calcified plaque. The origins of the great vessels are patent. No ev
idence of hemodynamically significant stenosis.

 

Upper thorax: Partially visualized lungs demonstrate no acute pathology. Coronary artery calcificatio
ns. Median sternotomy wires.

 

IMPRESSION:

1.  Complete occlusion of the left M2 segment of the middle cerebral artery within the sylvian fissur
e. This finding could be acute or chronic and overall, appears similar to prior study 12/16/2022. Con
 further evaluation with MRI brain as clinically indicated.

2.  No evidence of dissection involving the cervical internal carotid arteries. Approximately 60-70% 
stenosis of the proximal right ICA due to dense calcified plaque. No flow limiting stenosis of the le
ft internal carotid artery.

3.  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
ed above.

 

[facility], Workstation: [id number], 12/27/2024 6:53 PM

## 2024-12-28 LAB
CHOLEST SERPL-MCNC: 295 MG/DL (ref 0–200)
HDLC SERPL-MCNC: 72.9 MG/DL (ref 40–60)
LDLC SERPL CALC-MCNC: 206 MG/DL (ref 0–131)
TRIGL SERPL-MCNC: 80.7 MG/DL (ref 0–149)
VLDLC SERPL CALC-MCNC: 16.14 MG/DL (ref 5–40)

## 2024-12-28 RX ADMIN — HALOPERIDOL LACTATE PRN MG: 5 INJECTION, SOLUTION INTRAMUSCULAR at 19:07

## 2024-12-28 RX ADMIN — FUROSEMIDE ONE MG: 10 INJECTION, SOLUTION INTRAMUSCULAR; INTRAVENOUS at 17:50

## 2024-12-28 RX ADMIN — ASPIRIN 81 MG CHEWABLE TABLET SCH MG: 81 TABLET CHEWABLE at 10:17

## 2024-12-29 LAB
ANION GAP SERPL CALC-SCNC: 11 MMOL/L
BASOPHILS # BLD AUTO: 0 K/UL (ref 0–0.2)
BASOPHILS NFR BLD AUTO: 1 %
BUN SERPL-SCNC: 21 MG/DL (ref 7–17)
CALCIUM SPEC-MCNC: 9.3 MG/DL (ref 8.4–10.2)
CHLORIDE SERPL-SCNC: 109 MMOL/L (ref 98–107)
CO2 SERPL-SCNC: 22 MMOL/L (ref 22–30)
EOSINOPHIL # BLD AUTO: 0.1 K/UL (ref 0–0.7)
EOSINOPHIL NFR BLD AUTO: 2 %
ERYTHROCYTE [DISTWIDTH] IN BLOOD BY AUTOMATED COUNT: 4.41 M/UL (ref 3.8–5.4)
ERYTHROCYTE [DISTWIDTH] IN BLOOD: 12.3 % (ref 11.5–15.5)
GLUCOSE SERPL-MCNC: 106 MG/DL (ref 74–99)
HCT VFR BLD AUTO: 42 % (ref 34–46)
HGB BLD-MCNC: 13.7 GM/DL (ref 11.4–16)
LYMPHOCYTES # SPEC AUTO: 1.1 K/UL (ref 1–4.8)
LYMPHOCYTES NFR SPEC AUTO: 20 %
MCH RBC QN AUTO: 31.1 PG (ref 25–35)
MCHC RBC AUTO-ENTMCNC: 32.7 G/DL (ref 31–37)
MCV RBC AUTO: 95.1 FL (ref 80–100)
MONOCYTES # BLD AUTO: 0.4 K/UL (ref 0–1)
MONOCYTES NFR BLD AUTO: 7 %
NEUTROPHILS # BLD AUTO: 3.6 K/UL (ref 1.3–7.7)
NEUTROPHILS NFR BLD AUTO: 68 %
PLATELET # BLD AUTO: 231 K/UL (ref 150–450)
POTASSIUM SERPL-SCNC: 3.5 MMOL/L (ref 3.5–5.1)
SODIUM SERPL-SCNC: 142 MMOL/L (ref 137–145)
WBC # BLD AUTO: 5.3 K/UL (ref 3.8–10.6)

## 2024-12-29 RX ADMIN — LOSARTAN POTASSIUM SCH MG: 50 TABLET, FILM COATED ORAL at 10:20

## 2024-12-29 RX ADMIN — HEPARIN SODIUM SCH UNIT: 5000 INJECTION, SOLUTION INTRAVENOUS; SUBCUTANEOUS at 00:29

## 2024-12-29 RX ADMIN — EZETIMIBE SCH MG: 10 TABLET ORAL at 10:20

## 2024-12-29 RX ADMIN — PANTOPRAZOLE SODIUM SCH MG: 40 TABLET, DELAYED RELEASE ORAL at 10:20

## 2024-12-29 RX ADMIN — ASPIRIN 81 MG CHEWABLE TABLET SCH MG: 81 TABLET CHEWABLE at 10:20

## 2024-12-29 RX ADMIN — SPIRONOLACTONE SCH MG: 25 TABLET, FILM COATED ORAL at 10:20

## 2024-12-29 RX ADMIN — METOPROLOL SUCCINATE SCH MG: 25 TABLET, EXTENDED RELEASE ORAL at 10:20

## 2024-12-29 NOTE — P.PN
Subjective


Progress Note Date: 12/29/24





Patient was seen for follow-up.  Patient's son was also present by the bedside. 

He mentioned that this morning patient was doing a lot of conversation, but now 

has regressed later during the day.  Now she is trying to crawl out of the bed. 

She has pulled IV lines.  Patient continues to have gibberish speech, but she 

does speak some phrases very clearly.  Please refer to examination below.





Objective





- Vital Signs


Vital signs: 


                                   Vital Signs











Temp  98.8 F   12/29/24 00:33


 


Pulse  61   12/29/24 11:00


 


Resp  17   12/29/24 11:00


 


BP  183/77   12/29/24 11:00


 


Pulse Ox  97   12/29/24 04:04


 


FiO2      








                                 Intake & Output











 12/28/24 12/29/24 12/29/24





 18:59 06:59 18:59


 


Output Total  2800 


 


Balance  -2800 


 


Weight  73.164 kg 


 


Output:   


 


  Urine  2800 


 


    Uretheral (Lau)  2800 














- Exam





Patient able to speak some phrases clearly, although she is still very aphasic. 

Patient cannot name, cannot repeat, cannot comprehend, cannot read.  Patient 

states some spontaneous speech clearly like "he says I cannot walk".  "That is 

what he said".  Rest of the examination is nonfocal.





- Labs


CBC & Chem 7: 


                                 12/29/24 07:07





                                 12/29/24 07:07


Labs: 


                  Abnormal Lab Results - Last 24 Hours (Table)











  12/29/24 Range/Units





  07:07 


 


Chloride  109 H  ()  mmol/L


 


BUN  21 H  (7-17)  mg/dL


 


Glucose  106 H  (74-99)  mg/dL














Assessment and Plan


Assessment: 





* Probable acute CVA manifesting with receptive (Wernicke's) aphasia.  Patient 

  was not a candidate for TNK because she came outside the window.





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 

  chronic, as was seen with the previous images 12/16/2022


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 

  calcified plaque.


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 

  attempted thrombectomy was was not successful because of bilateral femoral 

  occlusions.


* Mild renal insufficiency


* Elevated cardiac enzymes


* Hyperlipidemia


* Ex tobacco use


* Hypertension 


* Diabetes














Plan: 





* Await MRI of the brain without contrast, evaluate for acute CVA


* 2-D echo with bubble study to rule out PFO


* CTA head and neck showed: Complete occlusion of the left M2 segment of the 

  middle cerebral artery within the sylvian fissure.  This finding could be 

  acute or chronic and overall, appears similar to prior study 12/16/2022.  No e

  vidence of dissection involving the cervical internal carotid arteries.  

  Approximately 60-70% stenosis of the proximal right ICA due to dense calcified

  plaque.  No flow limiting stenosis of the left ICA.  High-grade stenosis of 

  the left subclavian artery origin due to dense calcified plaque.  Neuroi

  ntervention do not recommend any intervention because of patient having 

  chronic femoral occlusions (as per formation from previous admission).


* Consult vascular surgery for high-grade stenosis of the left subclavian 

  artery.


* Fasting a.m. lipid panel with cholesterol 295, , HDL 72 and triglyce

  rides 80.0.  Continue Lipitor 80 mg and Zetia 10 mg.  Uncertain if patient is 

  compliant, as lipids are poorly controlled.


* Hemoglobin A1c pending


* Permissive hypertension for next 24-48 hours


* Patient was on aspirin 81 mg daily.  Now patient started on Brilinta 90 mg 

  twice daily.  Patient still significantly aphasic.


* Neuro checks every 4 hours.


* Telemetry monitoring rule out any arrhythmia


* PT, OT, speech therapy


* DVT prophylaxis: Heparin 5000 units subcu every 12 hours


* Discussed with neurointervention Dr. Baires in detail.


* Dr. Dominic De Leon to resume neurology service in the morning.

## 2024-12-29 NOTE — P.CNNES
History of Present Illness


Consult date: 12/28/24


Requesting physician: Rona Dubon


Reason for Consult: Acute CVA/history of CVA


History of Present Illness: 





Patient is a 89-year-old right-handed female came to the hospital by ambulance 

yesterday at 5:39 PM for acute stroke symptoms.  Patient's son was present, who 

provided with a history.  Apparently patient had a stroke 2 years ago, in which 

she lost her speech.  Patient received tPA and then was transferred to [facility], where thrombectomy was attempted, but patient was found to have 

blockages in bilateral femoral arteries.  While on the table, patient improved. 

Per patient's son, she improved drastically, and had some residual mild word 

finding difficulties at times, sometimes needs help with word during 

conversation but she can communicate very well.  No other deficits.  Patient has

been maintained on aspirin.





Yesterday patient was with her friends and at 1 PM they dropped her home and she

was perfectly fine.  Patient's son called her at 4:15 PM as he usually does on a

daily basis and at that time patient could not communicate, she was talking like

a foreign language, completely gibberish speech.  He lives in [place], therefore

he called patient's friends, who came over and found patient with altered mental

status and speech difficulty.  They called EMS and patient was brought to the 

ospital.  There was no report of facial droop, focal numbness tingling or 

weakness.





EMS flow sheet not available and the chart.  Vital signs arrival blood pressure 

117/77, pulse rate 5698.2.  Blood test shows normal CBC, PT/PTT, normal 

electrolytes, BU and 24 creatinine 1.08.  Hepatic panel is normal.  Troponin 

mildly elevated 0.085.  UA negative.  CT head showed no acute intracranial 

process.  I personally reviewed CT head and agree with the findings although 

there is evidence of a small area of possible old ischemia in the left parietal 

region.





EKG showed sinus bradycardia with first-degree AV block.  Chest x-ray showed no 

acute cardiopulmonary process.  Stroke code was activated in the ER.  ED staff 

discussed case with Dr. Baires, and patient was not a candidate for TNK because

she came just outside the window for TNK.  He did not recommend any 

intervention, but recommended to increasing her blood pressure, start Brilinta 

along with her home dose of aspirin 81 mg daily.





Patient has history of hypertension, diabetes for 30 years, hyperlipidemia.  She

has history of MI at age 77, also had undergone bypass surgery.  She smoked half

pack per day for 30 years, quit 30 years ago.  No marijuana.  Patient at present

lives by herself, but nursing aide comes over and checks on her on a daily 

basis.  Patient's son also calls her every day.  Patient's son mentions that 

patient is supposed to walk with a cane, but she often not uses it.  Her vision 

is well.











Review of Systems





Difficult to assess due to aphasia.  Patient denies headache.


ROS unobtainable: due to mental status





Past Medical History


Past Medical History: Diabetes Mellitus, Hyperlipidemia, Hypertension, 

Myocardial Infarction (MI)


Past Surgical History: Coronary Bypass/CABG


Past Psychological History: No Psychological Hx Reported


Smoking Status: Former smoker


Past Alcohol Use History: None Reported


Past Drug Use History: None Reported





Medications and Allergies


                                Home Medications











 Medication  Instructions  Recorded  Confirmed  Type


 


Aspirin EC [Ecotrin Low Dose] 81 mg PO DAILY 12/28/24 12/28/24 History


 


Atorvastatin [Lipitor] 80 mg PO DAILY 12/28/24 12/28/24 History


 


Ezetimibe [Zetia] 10 mg PO DAILY 12/28/24 12/28/24 History


 


Losartan Potassium [Cozaar] 100 mg PO DAILY 12/28/24 12/28/24 History


 


Metoprolol Succinate (ER) [Toprol 25 mg PO DAILY 12/28/24 12/28/24 History





Xl]    


 


Spironolactone [Aldactone] 25 mg PO DAILY 12/28/24 12/28/24 History


 


amLODIPine [Norvasc] 5 mg PO DAILY 12/28/24 12/28/24 History


 


hydroCHLOROthiazide [Hydrodiuril] 25 mg PO DAILY 12/28/24 12/28/24 History


 


metFORMIN HCL [Glucophage] 1,000 mg PO BID 12/28/24 12/28/24 History








                                    Allergies











Allergy/AdvReac Type Severity Reaction Status Date / Time


 


No Known Allergies Allergy   Verified 12/27/24 17:49














Physical Examination





- Vital Signs


Vital Signs: 


                                   Vital Signs











  Temp Pulse Resp BP Pulse Ox


 


 12/28/24 06:00   55 L  16  141/56  96


 


 12/28/24 02:50   70  18  155/60  97


 


 12/28/24 00:00   74  18  146/63  94 L


 


 12/27/24 22:15   72  18  165/68  93 L


 


 12/27/24 21:00   86  20  193/80  94 L


 


 12/27/24 18:50  97.9 F  62  20  111/77  98


 


 12/27/24 18:35  98.0 F  58 L  22  113/46  98


 


 12/27/24 18:20  98.0 F  61  22  89/61  95


 


 12/27/24 18:05  98.2 F  61  22  84/69  99


 


 12/27/24 17:45  98.2 F  56 L  18  117/77  99








                                Intake and Output











 12/27/24 12/28/24 12/28/24





 22:59 06:59 14:59


 


Output Total 2300  


 


Balance -2300  


 


Output:   


 


  Urine 2300  


 


Other:   


 


  # Bowel Movements 0  


 


  Weight 72.393 kg  














Patient is an elderly  female, who is somewhat somnolent, but did wake 

up.


Patient is alert awake.  Patient has severe fluent aphasia.  Patient cannot name

 any objects presented, cannot repeat, cannot comprehend, cannot read.  Patient 

is speaking gibberish language.  Attention, concentration and fund of knowledge 

cannot be assessed due to severe aphasia. 





On cranial nerve examination, pupils are equal, round and reacting to light, 

visual fields could not be tested as patient would not cooperate because of 

impaired comprehension.  However she does seem to blink with visual threat 

probably bilaterally.   Extraocular muscles are intact with no nystagmus.  Face 

is symmetric, and she would not protrude her tongue because of apraxia versus 

impaired comprehension. Hearing is slightly decreased and shoulder shrug normal,

 facial sensation normal.  





On muscle strength testing, there is no pronator drift and the strength is 

normal in arms and legs distally and proximally.





Deep tendon reflexes are symmetric 1+ and plantars are flat.





Sensory to touch could not be assessed reliably as patient has impaired 

comprehension and would not follow directions.





Cerebellar function showed no ataxia for finger-to-nose testing.  Could not 

check for lower extremities because of impaired comprehension.  Tone and bulk of

 muscles normal.





Gait deferred..





On general examination, there is no carotid bruit or murmur, S1-S2 audible.  

Chest is clear on consultation.  Abdomen is soft nontender.  No organomegaly, 

bowel sounds present.   Peripheral pulses are present.  No peripheral edema.





Results





- Laboratory Findings


CBC and BMP: 


                                 12/29/24 07:07





                                 12/29/24 07:07


Abnormal Lab Findings: 


                                  Abnormal Labs











  12/27/24 12/27/24 12/27/24





  17:54 22:06 22:06


 


APTT  19.5 L  


 


Chloride   110 H 


 


BUN   24 H 


 


Creatinine   1.08 H 


 


Glucose   133 H 


 


Creatine Kinase   179 H 


 


Troponin I    0.085 H*














Assessment and Plan


Assessment: 





* Probable acute CVA manifesting with receptive (Wernicke's) aphasia.  Patient 

  was not a candidate for TNK because she came outside the window.





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

  hronic, as was seen with the previous images 12/16/2022


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 

  calcified plaque.


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 

  attempted thrombectomy was was not successful because of bilateral femoral 

  occlusions.


* Mild renal insufficiency


* Elevated cardiac enzymes


* Hyperlipidemia


* Ex tobacco use


* Hypertension 


* Diabetes














Plan: 





* MRI of the brain without contrast, evaluate for acute CVA


* 2-D echo with bubble study to rule out PFO


* CTA head and neck showed: Complete occlusion of the left M2 segment of the 

  middle cerebral artery within the sylvian fissure.  This finding could be 

  acute or chronic and overall, appears similar to prior study 12/16/2022.  No 

  evidence of dissection involving the cervical internal carotid arteries.  

  Approximately 60-70% stenosis of the proximal right ICA due to dense calcified

   plaque.  No flow limiting stenosis of the left ICA.  High-grade stenosis of 

  the left subclavian artery origin due to dense calcified plaque.  N

  eurointervention do not recommend any intervention because of patient having 

  chronic femoral occlusions (as per formation from previous admission).


* Consult vascular surgery for high-grade stenosis of the left subclavian 

  artery.


* Fasting a.m. lipid panel with cholesterol 295, , HDL 72 and tri

  glycerides 80.0.  Continue Lipitor 80 mg and Zetia 10 mg.  Uncertain if 

  patient is compliant, as lipids are poorly controlled.


* Hemoglobin A1c


* Permissive hypertension for next 24-48 hours


* Patient was on aspirin 81 mg daily.  Now patient started on Brilinta 90 mg 

  twice daily.  Patient still significantly aphasic.


* Neuro checks as per protocol.


* Telemetry monitoring rule out any arrhythmia


* PT, OT, speech therapy


* DVT prophylaxis: Heparin 5000 units subcu every 12 hours


* Discussed with neurointervention Dr. Baires in detail.


* Neurology will continue to follow.  Thank you for the consult.  











Time with Patient: Greater than 30

## 2024-12-30 LAB
ANION GAP SERPL CALC-SCNC: 9 MMOL/L
BASOPHILS # BLD AUTO: 0 K/UL (ref 0–0.2)
BASOPHILS NFR BLD AUTO: 1 %
BUN SERPL-SCNC: 19 MG/DL (ref 7–17)
CALCIUM SPEC-MCNC: 9.3 MG/DL (ref 8.4–10.2)
CHLORIDE SERPL-SCNC: 109 MMOL/L (ref 98–107)
CO2 SERPL-SCNC: 23 MMOL/L (ref 22–30)
EOSINOPHIL # BLD AUTO: 0.2 K/UL (ref 0–0.7)
EOSINOPHIL NFR BLD AUTO: 3 %
ERYTHROCYTE [DISTWIDTH] IN BLOOD BY AUTOMATED COUNT: 4.37 M/UL (ref 3.8–5.4)
ERYTHROCYTE [DISTWIDTH] IN BLOOD: 12.2 % (ref 11.5–15.5)
GLUCOSE BLD-MCNC: 127 MG/DL (ref 70–110)
GLUCOSE BLD-MCNC: 147 MG/DL (ref 70–110)
GLUCOSE BLD-MCNC: 154 MG/DL (ref 70–110)
GLUCOSE BLD-MCNC: 176 MG/DL (ref 70–110)
GLUCOSE SERPL-MCNC: 148 MG/DL (ref 74–99)
HCT VFR BLD AUTO: 41.8 % (ref 34–46)
HGB BLD-MCNC: 14 GM/DL (ref 11.4–16)
LYMPHOCYTES # SPEC AUTO: 1.2 K/UL (ref 1–4.8)
LYMPHOCYTES NFR SPEC AUTO: 18 %
MCH RBC QN AUTO: 32 PG (ref 25–35)
MCHC RBC AUTO-ENTMCNC: 33.5 G/DL (ref 31–37)
MCV RBC AUTO: 95.6 FL (ref 80–100)
MONOCYTES # BLD AUTO: 0.5 K/UL (ref 0–1)
MONOCYTES NFR BLD AUTO: 7 %
NEUTROPHILS # BLD AUTO: 4.5 K/UL (ref 1.3–7.7)
NEUTROPHILS NFR BLD AUTO: 68 %
PLATELET # BLD AUTO: 235 K/UL (ref 150–450)
POTASSIUM SERPL-SCNC: 3.8 MMOL/L (ref 3.5–5.1)
SODIUM SERPL-SCNC: 141 MMOL/L (ref 137–145)
WBC # BLD AUTO: 6.7 K/UL (ref 3.8–10.6)

## 2024-12-30 NOTE — CA
Transthoracic Echo Report 

 Name: Olena Abraham 

 MRN:    [id number] 

 Age:    89     Gender:     F 

 

 :    1935 

 Exam Date:     2024 07:48 

 Exam Location: [facility] 

 Ht (in):     66     Wt (lb):     161 

 Ordering Physician:        Ayan Mccarthy MD 

 Attending/Referring Phys: 

 Technician         Cherri Oleary RDCS 

 Procedure CPT: 

 Indications:       CVA 

 

 Cardiac Hx: 

 Technical Quality:      Fair 

 Contrast 1:    Agitated Saline             Total Dose (mL):      9 

 Contrast 2:                                Total Dose (mL): 

 

 MEASUREMENTS  (Male / Female) Normal Values 

 2D ECHO 

 LV Diastolic Diameter PLAX        4.2 cm                4.2 - 5.9 / 3.9 - 5.3 cm 

 LV Systolic Diameter PLAX         2.8 cm                 

 IVS Diastolic Thickness           1.2 cm                0.6 - 1.0 / 0.6 - 0.9 cm 

 LVPW Diastolic Thickness          1.2 cm                0.6 - 1.0 / 0.6 - 0.9 cm 

 LV Relative Wall Thickness        0.6                    

 RV Internal Dim ED PLAX           3.4 cm                 

 LVOT Diameter                     2.1 cm                 

 LA Systolic Diameter LX           3.5 cm                3.0 - 4.0 / 2.7 - 3.8 cm 

 LA Volume                         41.9 cm???              18 - 58 / 22 - 52 cm??? 

 LA Volume Index                   22.6 cm???/m???           16 - 28 cm???/m??? 

 

 M-MODE 

 Aortic Root Diameter MM           3.4 cm                 

 AV Cusp Separation MM             1.7 cm                 

 

 DOPPLER 

 AV Peak Velocity                  213.2 cm/s             

 AV Peak Gradient                  18.2 mmHg              

 AV Mean Velocity                  145.0 cm/s             

 AV Mean Gradient                  9.3 mmHg               

 AV Velocity Time Integral         49.5 cm                

 LVOT Peak Velocity                108.8 cm/s             

 LVOT Peak Gradient                4.7 mmHg               

 LVOT Velocity Time Integral       29.6 cm                

 LVOT Stroke Volume                99.0 cm???               

 LVOT Stroke Volume Index          54.3 ml/m???             

 LVOT Cardiac Index                3197.1 cm???/min???m???      

 AV Area Cont Eq vti               2.0 cm???                

 AV Area Cont Eq pk                1.7 cm???                

 MV Area PHT                       3.0 cm???                

 Mitral E Point Velocity           61.6 cm/s              

 Mitral A Point Velocity           78.8 cm/s              

 Mitral E to A Ratio               0.8                    

 MV Deceleration Time              256.4 ms               

 TR Peak Velocity                  236.5 cm/s             

 TR Peak Gradient                  22.4 mmHg              

 Right Ventricular Systolic Press  25.8 mmHg              

 

 

 FINDINGS 

 Left Ventricle 

 Left ventricular ejection fraction is estimated at 50-55 %. Left ventricular  

 cavity size normal.  Mildly increased septal wall thickness. Mildly increased  

 posterior wall thickness. 

 

 Right Ventricle 

 Mild right ventricular dilatation. Right ventricular systolic pressure within  

 normal limits. 

 

 Right Atrium 

 No right atrial thrombus or mass seen. Normal right atrial size. Negative  

 agitated saline bubble study for right to left shunt. 

 

 Left Atrium 

 Normal left atrial size. No left atrial thrombus or mass present. 

 

 Mitral Valve 

 Structurally normal mitral valve. Trace mitral regurgitation. 

 

 Aortic Valve 

 Trileaflet aortic valve. Aortic valve sclerosis. Mild aortic stenosis with a  

 peak gradient of 18 mmHg and a mean gradient of 9 mmHg. 

 

 Tricuspid Valve 

 Structurally normal tricuspid valve. Mild tricuspid regurgitation. 

 

 Pulmonic Valve 

 Structurally normal pulmonic valve. No pulmonic regurgitation. 

 

 Pericardium 

 No pericardial effusion. 

 

 Aorta 

 Normal size aortic root and proximal ascending aorta. 

 

 CONCLUSIONS 

 Technically difficult study for interpretation 

 Normal LV and RV systolic function 

 Aortic sclerosis with mild stenosis 

 Mild mitral regurgitation 

 Previewed by:  

 Dr. Александр Xiao MD 

 (Electronically Signed) 

 Final Date:      2024 12:14

## 2024-12-30 NOTE — PN
PROGRESS NOTE



DATE OF SERVICE:  12/29/2024



SUBJECTIVE:

This is an 89-year-old woman, who was admitted with change in mental status as 
well as

expressive  acute stroke.  The patient is really delirious and confused.  The

patient also had multiple vascular lesions.  Neurology and Vascular Surgery 
evaluated

the patient.



PAST MEDICAL HISTORY:

Reviewed.



REVIEW OF SYSTEMS:

Could not be taken.  The patient is confused.



CURRENT MEDICATIONS:

Reviewed.



PHYSICAL EXAMINATION:

VITAL SIGNS:  Pulse is 61, blood pressure n, respirations 17.

HEENT:  Conjunctivae normal.

 scattered rhonchi.

ABDOMEN:  Soft.

LEGS:  No edema.

NERVOUS SYSTEM:  Diffusely weak.



LABORATORY DATA:

Reviewed.



ASSESSMENT:

1. Expressive dysphasia, possibly acute stroke.

2. Multiple vascular abnormalities and high-grade stenosis of the left 
subclavian

    artery as well as 60% to 70% stenosis of the proximal right internal carotid

    artery.

3. Diabetes mellitus, type 2.

4. Hypertension.

5. Hyperlipidemia.

6. Coronary artery disease and coronary artery bypass graft.



RECOMMENDATIONS:

This 89-year-old woman presented with multiple medical problems.  We will 
monitor the

patient closely.  I would recommend to continue current  symptomatic treatment.

Otherwise, I would recommend antiplatelet agents, Lipitor, closely follow with

Neurology.  PT/OT evaluation, possible ECF rehab.  MRI brain was ordered.  2D 
echo was

also ordered.  Vascular surgery consultation underway.  Once again, the 
prognosis is

guarded.  Discussed with son at the bedside.  Further recommendations to follow.





MILLIE / MICHELLE: [id number] / Job#: [id number]

MTDD

## 2024-12-30 NOTE — HP
HISTORY AND PHYSICAL



CHIEF COMPLAINT:

Change in mental status, expressive dysphasia.



HISTORY OF PRESENT ILLNESS:

This 89-year-old woman with a past medical history of multiple medical problems was

admitted with expressive aphasia and change in mental status.  Stroke was suspected.

The patient also had troponin elevated up to 0.085.  There is no history of any fever,

rigors, or chills.  The patient is confused at this time, unable to give a coherent

history.  The initial CAT scan of the brain did not show any acute abnormality.

Neurology evaluation is in progress.  Chest x-ray showed no acute cardiopulmonary

abnormality.



PAST MEDICAL HISTORY:

Reviewed and includes diabetes mellitus, hypertension, hyperlipidemia.  Rest of the

chart is also reviewed.



HOME MEDICATIONS:

Reviewed and include Cozaar.  Doses and the rest of the medications are reviewed.



ALLERGIES:

None.



FAMILY HISTORY:

No history of heart disease or strokes in the family.



SOCIAL HISTORY:

Previous history of smoking.  No history of alcohol.



REVIEW OF SYSTEMS:

Fourteen-point review of systems could not be taken because of the patient's change in

mental status.



PHYSICAL EXAMINATION:

GENERAL:  The patient is confused.

VITAL SIGNS:  Pulse 56, blood pressure 119/55, respirations 18.

HEENT:  Conjunctivae normal.

NECK:  No JVD.

CARDIOVASCULAR:  S1 and S2.

RESPIRATIONS:  Breath sounds diminished at the bases.  A few scattered rhonchi and

crackles.

ABDOMEN:  Soft, nontender, obese.

LEGS:  No edema.  No swelling.

NERVOUS SYSTEM:  No focal deficits.  Moves all 4 limbs.

SKIN:  No ulcer, rash, bleeding.

JOINTS:  No active deforming arthropathy.



LABORATORY DATA:

Reviewed.



ASSESSMENT:

1. Change in mental status, expressive aphasia, acute stroke versus transient ischemic

    attack, present on admission.

2. Possible acute delirium.

3. Troponin 0.085, indeterminate.

4. Hypertension.

5. Hyperlipidemia.

6. Diabetes mellitus, type 2.

7. History of myocardial infarction.

8. History of coronary artery disease and coronary artery bypass graft.



RECOMMENDATIONS AND DISCUSSION:

This 89-year-old woman presented with multiple medical problems.  We will monitor the

patient closely, continue the antiplatelet agents.  Otherwise, I would recommend a

complete stroke workup including 2D echo with Doppler and carotid ultrasound.

Neurology consultation.  Otherwise, resume the home medications once they are

confirmed.  Prognosis guarded because of multiple complex medical issues.  Further

recommendations to follow.  See orders for details.





MMODL / IJN: [id number] / Job#: [id number]

## 2024-12-30 NOTE — P.GSCN
History of Present Illness


Consult date: 12/30/24


Reason for Consult: 





CVA, carotid stenosis


History of present illness: 





89-year-old right-handed female with history of CVA presented to the hospital 

secondary to acute stroke symptoms.  Patient's son states that she has been 

doing well and was previously self-sufficient and yesterday had issues with her 

speech.  She previously had a stroke 2 years ago which had the same symptoms and

attempt at thrombectomy was performed but due to arterial occlusive disease in 

the groin femoral, arterial system it was unable to be completed.  She did well 

after that stroke and according to her family completely resolved and was back 

to normal.  Yesterday she was having confusion with her words and was unable to 

speak in coherent sentences according to the son was speaking gibberish.  He 

states that she is still speaking gibberish but now forming sentences since she 

has been at the hospital.  She did not have any significant weakness, numbness 

or tingling.  She was outside the window for TNK and was worked up for her 

stroke with CT angiogram of her neck finding carotid stenosis of the right ICA 

and left subclavian artery with left M2 occlusion.  She is still unable to 

answer questions currently due to her gibberish and family is at the bedside who

is answering the questions.  She denies any pain currently.  She does have a 

history of pain in her lower extremities according to her family with ambulation

likely due to arterial occlusive disease.  She denies any fevers, chills, chest 

pain or shortness of breath.

















Review of Systems


All systems: negative (What is mentioned in the HPI or past medical history)





Past Medical History


Past Medical History: Diabetes Mellitus, Hyperlipidemia, Hypertension


History of Any Multi-Drug Resistant Organisms: None Reported


Past Surgical History: Coronary Bypass/CABG


Past Psychological History: No Psychological Hx Reported


Smoking Status: Former smoker


Past Alcohol Use History: None Reported


Past Drug Use History: None Reported





Medications and Allergies


                                Home Medications











 Medication  Instructions  Recorded  Confirmed  Type


 


Aspirin EC [Ecotrin Low Dose] 81 mg PO DAILY 12/28/24 12/28/24 History


 


Atorvastatin [Lipitor] 80 mg PO DAILY 12/28/24 12/28/24 History


 


Ezetimibe [Zetia] 10 mg PO DAILY 12/28/24 12/28/24 History


 


Losartan Potassium [Cozaar] 100 mg PO DAILY 12/28/24 12/28/24 History


 


Metoprolol Succinate (ER) [Toprol 25 mg PO DAILY 12/28/24 12/28/24 History





Xl]    


 


Spironolactone [Aldactone] 25 mg PO DAILY 12/28/24 12/28/24 History


 


amLODIPine [Norvasc] 5 mg PO DAILY 12/28/24 12/28/24 History


 


hydroCHLOROthiazide [Hydrodiuril] 25 mg PO DAILY 12/28/24 12/28/24 History


 


metFORMIN HCL [Glucophage] 1,000 mg PO BID 12/28/24 12/28/24 History








                                    Allergies











Allergy/AdvReac Type Severity Reaction Status Date / Time


 


No Known Allergies Allergy   Verified 12/27/24 17:49














Surgical - Exam


                                   Vital Signs











Temp Pulse Resp BP Pulse Ox


 


 98.2 F   56 L  18   117/77   99 


 


 12/27/24 17:45  12/27/24 17:45  12/27/24 17:45  12/27/24 17:45  12/27/24 17:45











Patient Seen Date: 12/30/24


Patient Seen Time: 14:00





- General


well developed, well nourished, no distress





- Eyes


PERRL, normal ocular movement





- ENT


normal pinna, normal nares





- Neck


no masses





- Respiratory


normal expansion, normal respiratory effort





- Cardiovascular


Rhythm: regular





- Abdomen


Abdomen: soft, non tender





- Integumentary


no rash, no growths





- Neurologic


normal coordination, normal sensation





- Psychiatric


oriented to time, oriented to person








Patient is alert awake.  


Patient is speaking gibberish language.


Muscle strength is equal bilaterally upper and lower extremity.  Good  

strength bilaterally.








Results





CTA neck reviewed demonstrating left M2 occlusion, right ICA stenosis and left 

subclavian artery stenosis





- Labs





                                 12/30/24 07:34





                                 12/30/24 07:34


                  Abnormal Lab Results - Last 24 Hours (Table)











  12/29/24 12/30/24 12/30/24 Range/Units





  11:03 06:07 07:34 


 


Chloride    109 H  ()  mmol/L


 


BUN    19 H  (7-17)  mg/dL


 


Glucose    148 H  (74-99)  mg/dL


 


POC Glucose (mg/dL)   127 H   ()  mg/dL


 


Hemoglobin A1c  7.5 H    (<=6.0)  %














  12/30/24 Range/Units





  11:16 


 


Chloride   ()  mmol/L


 


BUN   (7-17)  mg/dL


 


Glucose   (74-99)  mg/dL


 


POC Glucose (mg/dL)  176 H  ()  mg/dL


 


Hemoglobin A1c   (<=6.0)  %








                                 Diabetes panel











  12/29/24 12/30/24 Range/Units





  11:03 07:34 


 


Sodium   141  (137-145)  mmol/L


 


Potassium   3.8  (3.5-5.1)  mmol/L


 


Chloride   109 H  ()  mmol/L


 


Carbon Dioxide   23  (22-30)  mmol/L


 


BUN   19 H  (7-17)  mg/dL


 


Creatinine   0.86  (0.52-1.04)  mg/dL


 


Glucose   148 H  (74-99)  mg/dL


 


Hemoglobin A1c  7.5 H   (<=6.0)  %


 


Calcium   9.3  (8.4-10.2)  mg/dL








                                  Calcium panel











  12/30/24 Range/Units





  07:34 


 


Calcium  9.3  (8.4-10.2)  mg/dL








                                 Pituitary panel











  12/30/24 Range/Units





  07:34 


 


Sodium  141  (137-145)  mmol/L


 


Potassium  3.8  (3.5-5.1)  mmol/L


 


Chloride  109 H  ()  mmol/L


 


Carbon Dioxide  23  (22-30)  mmol/L


 


BUN  19 H  (7-17)  mg/dL


 


Creatinine  0.86  (0.52-1.04)  mg/dL


 


Glucose  148 H  (74-99)  mg/dL


 


Calcium  9.3  (8.4-10.2)  mg/dL








                                  Adrenal panel











  12/30/24 Range/Units





  07:34 


 


Sodium  141  (137-145)  mmol/L


 


Potassium  3.8  (3.5-5.1)  mmol/L


 


Chloride  109 H  ()  mmol/L


 


Carbon Dioxide  23  (22-30)  mmol/L


 


BUN  19 H  (7-17)  mg/dL


 


Creatinine  0.86  (0.52-1.04)  mg/dL


 


Glucose  148 H  (74-99)  mg/dL


 


Calcium  9.3  (8.4-10.2)  mg/dL














Assessment and Plan


Assessment: 





Acute CVA with aphasia


Right carotid artery stenosis


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


Left subclavian artery stenosis


History of CVA with aphasia


Hyperlipidemia


Hypertension


Diabetes





Plan: 





Reviewed CTA of the neck independently which demonstrates mild right ICA 

stenosis as well as questionable left M2 occlusion.


Agree with MRI to evaluate for acute CVA and extent of CVA.


After that long discussion was had with the patient and family they would like 

to discuss more in depth to forego any possible surgical intervention at this 

time.


Agree with medical management including aspirin and Brilinta.


Will await the MRI results to determine if there is any surgical intervention 

required for her carotid stenosis.


She does not have any symptoms for her subclavian artery stenosis and therefore 

would evaluate as an outpatient with upper extremity arterial Doppler and do not

 recommend any surgical intervention.


Would also recommend carotid Doppler as an outpatient for follow-up.


Thank you for the consultation

## 2024-12-30 NOTE — P.PN
Subjective


Progress Note Date: 12/30/24








This is an 89-year-old female who was recently admitted with expressive aphasia 

undergoing neurological workup with neuro following.  Patient scheduled to 

undergo MRI which is pending at this time.  Vascular surgery consulted for 

carotid stenosis evaluation and also patient is scheduled to undergo 2D echo 

with bubble study.  Patient is afebrile with no reports of chest pain or 

shortness of breath at this time.  Patient continued on aspirin and Brilinta as 

well as statin therapy and will continue.  Recommend PT/OT therapy evaluation.








Review of systems:


Constitutional: No reports of fatigue, fever, or chills


Cardiovascular: No reports of chest pain or palpitations


Respiratory: No reports of shortness of breath or cough


GI: No reports of nausea, no reports of  vomiting, no diarrhea


: No reports of dysuria or retention


Neurovascular:  reports of generalized weakness





All medications have been reviewed





Active Medications





Amlodipine Besylate (Amlodipine 5 Mg Tab)  5 mg PO DAILY [facility]


   Last Admin: 12/30/24 09:47 Dose:  5 mg


   


Aspirin (Aspirin 81 Mg)  81 mg PO DAILY [facility]


   Last Admin: 12/30/24 09:47 Dose:  81 mg


   


Atorvastatin Calcium (Atorvastatin 80 Mg Tab)  80 mg PO HS [facility]


   Last Admin: 12/30/24 19:35 Dose:  80 mg


   


Ezetimibe (Ezetimibe 10 Mg Tab)  10 mg PO DAILY [facility]


   Last Admin: 12/30/24 09:48 Dose:  10 mg


   


Haloperidol Lactate (Haloperidol Lactate 5 Mg/Ml 1 Ml Vial)  3 mg IM Q6HR PRN


   PRN Reason: Agitation or Acute Psychosis


   Last Admin: 12/28/24 19:07 Dose:  3 mg


   


Heparin Sodium (Porcine) (Heparin Sodium,Porcine 5,000 Unit/Ml 1 Ml Vial)  5,000

unit SQ Q12HR [facility]


   Last Admin: 12/30/24 19:36 Dose:  5,000 unit


   


Hydrochlorothiazide (Hydrochlorothiazide 25 Mg Tab)  25 mg PO DAILY [facility]


   Last Admin: 12/30/24 09:47 Dose:  25 mg


   


Sodium Chloride (Saline 0.9%)  1,000 mls @ 50 mls/hr IV .Q20H [facility]


   Last Admin: 12/30/24 01:54 Dose:  50 mls/hr


   


Lorazepam (Lorazepam 2 Mg/Ml Inj)  1 mg IV Q2HR PRN


   PRN Reason: Agitation


   Last Admin: 12/29/24 16:11 Dose:  1 mg


   


Losartan Potassium (Losartan 50 Mg Tab)  100 mg PO DAILY [facility]


   Last Admin: 12/30/24 09:47 Dose:  100 mg


   


Metoprolol Succinate (Metoprolol Succinate (Er) 25 Mg Tab.Er.24h)  25 mg PO 

DAILY [facility]


   Last Admin: 12/30/24 09:48 Dose:  25 mg


   


Pantoprazole Sodium (Pantoprazole 40 Mg Tablet)  40 mg PO [id number] [facility]


   Last Admin: 12/30/24 05:59 Dose:  Not Given


   


Spironolactone (Spironolactone 25 Mg Tab)  25 mg PO DAILY [facility]


   Last Admin: 12/30/24 09:47 Dose:  25 mg


   


Ticagrelor (Ticagrelor 90 Mg Tab)  90 mg PO BID [facility]


   Last Admin: 12/30/24 19:36 Dose:  90 mg


   








PHYSICAL EXAMINATION: 





GENERAL: The patient is alert and oriented x 3, well developed, elderly 

appearing, thin built


HEENT: Pupils are round and equally reacting to light. EOMI. no scleral icterus.

No conjunctival pallor. Normocephalic, atraumatic. No pharyngeal erythema. No 

thyromegaly.  


CARDIOVASCULAR: S1 and S2  muffled 


PULMONARY: diminished breath sounds bilaterally with no wheezing or rhonchi 

noted. 


ABDOMEN: soft.  Nontender on exam.  Thin. non-distended, normoactive bowel 

sounds. No palpable organomegaly. 


MUSCULOSKELETAL: No joint swelling or deformity.


EXTREMITIES: No cyanosis, clubbing, or pedal edema.  


NEUROLOGICAL: Gross neurological examination did not reveal any focal deficits. 

Diffuse weakness


SKIN: No rashes. 





Assessment:





Change in mental status, expressive aphasia, rule out acute CVA versus TIA


Possible acute delirium


Troponin 0.085, indeterminant


Hypertension history


Hyperlipidemia history


Diabetes mellitus, type II


History of previous myocardial infarction


History of coronary artery disease with CABG


GI prophylaxis


DVT prophylaxis


Full code with no intubation





Plan:





Recommend to continue with current medications and management with neurology 

following.  Patient to undergo MRI of the brain which is pending at this time


Vascular surgery consulted and also awaiting MRI of the brain to evaluate [facility]

er if there was CVA.  Patient will need outpatient follow-up and will continue 

on aspirin and Brilinta for now.


Home medications reviewed and resumed as appropriate


Recommend PT/OT therapy


Overall prognosis is guarded at this time


Will discuss further with neurology and vascular surgery regarding discharge 

planning once MRI is obtained








The impression and plan of care has been dictated by Lorie Nichole, nurse 

practitioner as directed.





Dr. Ricky MD


I have performed a history and examination and MDM of this patient, discussed 

the same with the dictator, and  agree with the dictator's assessment and plan 

as written ,documented as a scribe. Based on total visit time,  I have performed

more than 50% of the visit. Any additional findings or plans will be noted.





Objective





- Vital Signs


Vital signs: 


                                   Vital Signs











Temp  97.9 F   12/30/24 20:00


 


Pulse  64   12/30/24 20:00


 


Resp  15   12/30/24 20:00


 


BP  168/69   12/30/24 20:00


 


Pulse Ox  97   12/30/24 20:00


 


FiO2      








                                 Intake & Output











 12/30/24 12/30/24 12/31/24





 06:59 18:59 06:59


 


Intake Total 0 720 


 


Output Total 600  


 


Balance -600 720 


 


Weight 74 kg  


 


Intake:   


 


  Oral 0 720 


 


Output:   


 


  Urine 600  


 


Other:   


 


  Voiding Method Indwelling Catheter Indwelling Catheter Bedside Commode





   Diaper


 


  # Voids  2 1


 


  # Bowel Movements  2 














- Labs


CBC & Chem 7: 


                                 12/30/24 07:34





                                 12/30/24 07:34


Labs: 


                  Abnormal Lab Results - Last 24 Hours (Table)











  12/30/24 12/30/24 12/30/24 Range/Units





  06:07 07:34 11:16 


 


Chloride   109 H   ()  mmol/L


 


BUN   19 H   (7-17)  mg/dL


 


Glucose   148 H   (74-99)  mg/dL


 


POC Glucose (mg/dL)  127 H   176 H  ()  mg/dL














  12/30/24 12/30/24 Range/Units





  16:47 20:21 


 


Chloride    ()  mmol/L


 


BUN    (7-17)  mg/dL


 


Glucose    (74-99)  mg/dL


 


POC Glucose (mg/dL)  147 H  154 H  ()  mg/dL

## 2024-12-31 LAB
GLUCOSE BLD-MCNC: 126 MG/DL (ref 70–110)
GLUCOSE BLD-MCNC: 132 MG/DL (ref 70–110)
GLUCOSE BLD-MCNC: 140 MG/DL (ref 70–110)
GLUCOSE BLD-MCNC: 216 MG/DL (ref 70–110)

## 2024-12-31 NOTE — MR
INDICATION: 

Patient age:Female;  89 years old; 

Reason for study: CVA; PHH.

 

COMPARISON: CT brain 12/27/2024, 12/16/2022.

 

TECHNIQUE: Multi planar, multi sequence imaging was performed through the brain without administratio
n of intravenous contrast.

 

FINDINGS: 

 

The gray-white junctions, ventricular system, basal cisterns appear unremarkable. Diffuse cerebral vo
lume loss. Diffusion-weighted imaging shows multiple foci and regions of restricted diffusion within 
the left parietal and temporal lobes. Additional more subacute appearing region within the medial asp
ect of the right frontal lobe. There is corresponding high T2/FLAIR signal within these regions. Intr
acranial arterial flow voids are maintained. Midline structures show no abnormality.  The susceptibil
ity weighted images demonstrate curvilinear region of blooming artifact within the medial aspect of t
he right frontal lobe and within the left temporal sulci. Additional focus identified within the righ
t cerebellum.

 

The bone marrow signal is within normal limits.  Mild mucosal thickening within the left sphenoid sin
us. The remaining paranasal sinuses are unremarkable. Bilateral aphakia.

 

IMPRESSION:

1. Acute/subacute ischemia identified throughout the left parietal and temporal lobes. Additional mor
e subacute appearing region within the medial aspect of the right frontal lobe. Raises concern for po
ssible embolic phenomenon.

2. Mild hemosiderin deposition within the medial aspect of the right frontal lobe and within the left
 temporal lobe.

 

 

[facility], Workstation: [id number], 12/31/2024 10:32 AM

## 2024-12-31 NOTE — P.PN
Subjective


Progress Note Date: 12/31/24


Seeing the patient for the first time during this admission.  Please refer to 

Dr. Mccarthy's note for further details.


Son is at bedside and it seems that she has speech difficulty which is new.  She

does not have any history of atrial fibrillation prior to this.  MRI of the 

brain reveals concern for embolic stroke.  Per the nurse so far the EEG does not

reveal any A-fib.








Objective





- Vital Signs


Vital signs: 


                                   Vital Signs











Temp  98.0 F   12/31/24 04:00


 


Pulse  64   12/31/24 14:00


 


Resp  18   12/31/24 14:00


 


BP  135/68   12/31/24 12:00


 


Pulse Ox  99   12/31/24 12:00


 


FiO2      








                                 Intake & Output











 12/30/24 12/31/24 12/31/24





 18:59 06:59 18:59


 


Intake Total 720 220 440


 


Balance 720 220 440


 


Weight  65.4 kg 


 


Intake:   


 


  IV  20 20


 


    Invasive Line 3  20 20


 


  Oral 720 200 420


 


Other:   


 


  Voiding Method Indwelling Catheter Bedside Commode Bedside Commode





  Diaper Diaper


 


  # Voids 2 2 


 


  # Bowel Movements 2  1














- Exam


General: Sitting in a recliner chair and is not in acute distress.


Neuro:


Limited.


Has severe aphasia appears her speech is extremely garbled.  Was able to follow 

some simple commands by mimicking me such as lifting extremity above gravity 

upper and lower and appears symmetrical.  No facial droop.  No dysarthria.





- Labs


CBC & Chem 7: 


                                 12/30/24 07:34





                                 12/30/24 07:34


Labs: 


                  Abnormal Lab Results - Last 24 Hours (Table)











  12/30/24 12/30/24 12/31/24 Range/Units





  16:47 20:21 06:25 


 


POC Glucose (mg/dL)  147 H  154 H  140 H  ()  mg/dL














  12/31/24 Range/Units





  11:40 


 


POC Glucose (mg/dL)  132 H  ()  mg/dL














Assessment and Plan


Assessment: 





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  Patient has acute 

  to subacute stroke over the left parietal and temporal region.  As well as has

  subacute appearing region in the medial aspect of the right frontal etiology 

  unknown but seems embolic in nature.  Rule out any A-fib or flutter.  Patient 

  was not a candidate for TNK because she came outside the window.  


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 

  chronic, as was seen with the previous images 12/16/2022.  


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 

  calcified plaque.


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 

  attempted thrombectomy was was not successful because of bilateral femoral 

  occlusions.


* Mild renal insufficiency


* Elevated cardiac enzymes


* Hyperlipidemia


* Ex tobacco use


* Hypertension 


* Diabetes





Plan: 





* MRI brain is reported as acute/subacute ischemic identified throughout the 

  left parietal and temporal lobe.  Additional more subacute appearing lesion in

  the medial aspect of the right frontal lobe.  Raises concern for possible 

  embolic phenomenon.  Mild hemosiderin deposition within the medial aspect of 

  the right frontal and with left temporal lobe.


* 2-D echo: Read as technically difficult study for agitation.  Normal left 

  ventricle and right ventricle systolic function.  Aortic sclerosis with mild 

  stenosis.


* CTA head and neck showed: Complete occlusion of the left M2 segment of the 

  middle cerebral artery within the sylvian fissure.  This finding could be 

  acute or chronic and overall, appears similar to prior study 12/16/2022.  No 

  evidence of dissection involving the cervical internal carotid arteries.  

  Approximately 60-70% stenosis of the proximal right ICA due to dense calcified

  plaque.  No flow limiting stenosis of the left ICA.  High-grade stenosis of 

  the left subclavian artery origin due to dense calcified plaque.  

  Neurointervention do not recommend any intervention because of patient having 

  chronic femoral occlusions (as per formation from previous admission).


* vascular surgery for high-grade stenosis of the left subclavian artery.  


* Fasting a.m. lipid panel with cholesterol 295, , HDL 72 and 

  triglycerides 80.0.  Continue Lipitor 80 mg and Zetia 10 mg.  Uncertain if 

  patient is compliant, as lipids are poorly controlled.


* Hemoglobin A1c: 7.5


* Recommend normotensive blood pressure.


* Patient was on aspirin 81 mg daily.  Now patient started on Brilinta 90 mg 

  twice daily.  


* Recommend transesophageal echocardiogram to rule out any PFO or thrombus 

  especially since the 2D echo was limited and unknown source of the stroke.  

  Also recommend 30-day event monitor and venous duplex of extremities to rule 

  out any DVT.  I notified about the workup to the patient's son who is at 

  bedside and he was in agreement of the workup.  I consulted cardiology team.


* Neuro checks every 4 hours.


* Telemetry monitoring rule out any arrhythmia


* PT, OT, speech therapy


* DVT prophylaxis: Heparin 5000 units subcu every 12 hours





The plan discussed with the patient son was at bedside, primary attending and as

well as the nurse





Time with Patient: Less than 30

## 2024-12-31 NOTE — US
EXAMINATION TYPE: US venous doppler duplex LE 

 

DATE OF EXAM: 12/31/2024 1:37 PM

 

COMPARISON: NONE

 

CLINICAL INDICATION: Female, 89 years old with history of embolic stroke; stroke, Pain

 

TECHNIQUE:  The lower extremity deep venous system is examined utilizing real time linear array sonog
janet with graded compression, color doppler sonography, and spectral doppler.

 

SIDE PERFORMED: Bilateral  

 

FINDINGS:

 

VESSELS IMAGED:

Common Femoral Vein

Deep Femoral Vein

Greater Saphenous Vein *

Femoral Vein

Popliteal Vein

Small Saphenous Vein *

Proximal Calf Veins

(* superficial vessels)

 

 

Right Leg:  Positive for DVT, Color Doppler imaging shows patency of other vessels. 

 

Left Leg:  Negative for DVT, Color Doppler imaging shows patency of the vessels. Spectral waveforms a
re within normal limits. 

 

 

IMPRESSION: 

 

1. Right lower extremity: Positive for DVT at the upper and mid right popiteal vein, extremely limite
d exam due to combative patient.

2. Left lower extremity: No evidence for DVT imaged from the right to the upper calf.

 

 

[facility], Workstation: [id number], 12/31/2024 5:22 PM

## 2024-12-31 NOTE — P.PN
Subjective





This is an 89-year-old female who was recently admitted with expressive aphasia 

undergoing neurological workup with neuro following.  Patient scheduled to 

undergo MRI which is pending at this time.  Vascular surgery consulted for 

carotid stenosis evaluation and also patient is scheduled to undergo 2D echo 

with bubble study.  Patient is afebrile with no reports of chest pain or 

shortness of breath at this time.  Patient continued on aspirin and Brilinta as 

well as statin therapy and will continue.  Recommend PT/OT therapy evaluation.





12/13


Patient presents with aphasia and confusion suspicious for acute stroke, she had

MRI of the brain today showing acute/subacute ischemic foci of the left parietal

and temporal lobe with embolic phenomena is suspected.  I discussed the case 

with the neurology service.  FOX is recommended and cardiology team were 

consulted.


Further workup recommended by neurologist including ultrasound of the leg which 

came back positive for DVT in the right lower extremity.  We are going to start 

heparin drip once cleared by neurology given her acute stroke.  Family to be 

informed, discussed with the staff.


Patient is on aspirin and Brilinta, however if you start heparin drip may hold 

one of the antiplatelet medication to decrease risk of bleeding


However overall prognosis is guarded and patient will get worse I would 

recommend to go more with palliative approach to treat her given her age and 

multiple and complicated medical illness.


Patient herself was still confused, able to talk, follow simple commands but not

others.


Denies chest pain or dyspnea.  No other new complaint.


Her blood pressure was on the low side and was bradycardic down to 40s, we 

lowered her metoprolol dose 25 mg down to 12.5 mg and held her 

hydrochlorothiazide 25 mg


Patient currently on metoprolol 12.5 mg, lis losartan 100 mg and 

Norvasc/amlodipine 5 mg








                               Active Medications











Generic Name Dose Route Start Last Admin





  Trade Name Freq  PRN Reason Stop Dose Admin


 


Amlodipine Besylate  5 mg  12/29/24 09:00  12/31/24 10:54





  Amlodipine 5 Mg Tab  PO   5 mg





  DAILY WILLEM   Administration


 


Aspirin  81 mg  12/29/24 09:00  12/31/24 10:53





  Aspirin 81 Mg  PO   81 mg





  DAILY WILLEM   Administration


 


Atorvastatin Calcium  80 mg  12/27/24 21:00  12/30/24 19:35





  Atorvastatin 80 Mg Tab  PO   80 mg





  HS WILLEM   Administration


 


Ezetimibe  10 mg  12/29/24 09:00  12/31/24 10:53





  Ezetimibe 10 Mg Tab  PO   10 mg





  DAILY WILLEM   Administration


 


Haloperidol Lactate  3 mg  12/28/24 19:01  12/31/24 17:00





  Haloperidol Lactate 5 Mg/Ml 1 Ml Vial  IM   3 mg





  Q6HR PRN   Administration





  Agitation or Acute Psychosis  


 


Sodium Chloride  1,000 mls @ 50 mls/hr  12/27/24 20:45  12/31/24 10:58





  Saline 0.9%  IV   Not Given





  .Q20H WILLEM  


 


Lorazepam  1 mg  12/27/24 23:25  12/31/24 15:33





  Lorazepam 2 Mg/Ml Inj  IV   1 mg





  Q2HR PRN   Administration





  Agitation  


 


Losartan Potassium  100 mg  12/29/24 09:00  12/31/24 10:54





  Losartan 50 Mg Tab  PO   100 mg





  DAILY WILLEM   Administration


 


Metoprolol Succinate  12.5 mg  01/01/25 09:00 





  Metoprolol Succinate (Er) 25 Mg Tab.Er.24h  PO  





  DAILY WILLEM  


 


Pantoprazole Sodium  40 mg  12/29/24 07:30  12/31/24 06:25





  Pantoprazole 40 Mg Tablet  PO   Not Given





  AC-BRKFST [facility]  


 


Spironolactone  25 mg  12/29/24 09:00  12/31/24 10:53





  Spironolactone 25 Mg Tab  PO   25 mg





  DAILY WILLEM   Administration

















Objective





- Vital Signs


Vital signs: 


                                   Vital Signs











Temp  98.0 F   12/31/24 04:00


 


Pulse  52 L  12/31/24 08:00


 


Resp  16   12/31/24 08:00


 


BP  105/71   12/31/24 08:00


 


Pulse Ox  99   12/31/24 08:00


 


FiO2      








                                 Intake & Output











 12/30/24 12/31/24 12/31/24





 18:59 06:59 18:59


 


Intake Total 720 220 430


 


Balance 720 220 430


 


Weight  65.4 kg 


 


Intake:   


 


  IV  20 10


 


    Invasive Line 3  20 10


 


  Oral 720 200 420


 


Other:   


 


  Voiding Method Indwelling Catheter Bedside Commode Bedside Commode





  Diaper Diaper


 


  # Voids 2 2 


 


  # Bowel Movements 2  1














- Exam





-GENERAL: The patient is awake but very weak and confused, she follows simple 

commands, she has some word finding difficulties, not in any acute distress. 

Well developed, well nourished. 


HEENT: Pupils are round and equally reacting to light. EOMI. No scleral icterus.

No conjunctival pallor. Normocephalic, atraumatic. No pharyngeal erythema. No 

thyromegaly. 


CARDIOVASCULAR: S1 and S2 present. No murmurs, rubs, or gallops. 


PULMONARY: Chest is clear to auscultation, no wheezing , no crackles. 


ABDOMEN: Soft, nontender, nondistended, normoactive bowel sounds. No palpable 

organomegaly. 


MUSCULOSKELETAL: No joint swelling or deformity. 


EXTREMITIES: No cyanosis, clubbing, or pedal edema. 


-NEUROLOGICAL: G cranial nerves are grossly intact, patient somewhat confused.  

Possible mild right side weakness


SKIN: No rashes. no petechiae.








- Labs


CBC & Chem 7: 


                                 12/30/24 07:34





                                 12/30/24 07:34


Labs: 


                  Abnormal Lab Results - Last 24 Hours (Table)











  12/30/24 12/30/24 12/31/24 Range/Units





  16:47 20:21 06:25 


 


POC Glucose (mg/dL)  147 H  154 H  140 H  ()  mg/dL














  12/31/24 Range/Units





  11:40 


 


POC Glucose (mg/dL)  132 H  ()  mg/dL














Assessment and Plan


Assessment: 





-Acute confusion and aphasia with possible right hemiparesis secondary to 

Acute/subacute stroke with MRI showing acute/subacute ischemic foci of the left 

parietal and temporal lobe with embolic phenomena


-Acute DVT of the right lower extremity


-Vascular disease with complete occlusion of the left M2 segment of middle 

cerebral artery, right internal carotid artery stenosis 60 to 70%, and high-

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


-Elevated troponin


-Altered mental status most likely vascular dementia and possible elements of 

the high mild dementia complicated by stroke as above


-Hypertension, currently better controlled


-Diabetes mellitus type 2


-














Plan: 





Continue with dual antiplatelet therapy of aspirin and Brilinta, however if you 

are going to start heparin drip I would recommend to hold one of the 

antiplatelet medication to decrease risk of bleeding


Please discuss with the neurology team about clearance for starting 

anticoagulation with heparin drip


Consult cardiology team for FOX given possible embolic stroke


Vascular surgery team on consult for several vascular diseases including the 

middle cerebral artery, right internal carotid artery and left subclavian artery


Labs and medication were reviewed..  Continue same treatment.  Continue with 

symptomatic treatment.  Resume home medication.  Monitor labs and vitals.  DVT 

and GI prophylaxis.  Further recommendations as per clinical course of the 

patient


DVT prophylaxis:  heparin


GI Prophylaxis: Pepcid


PT/OT: Pending


Prognosis is guarded


I would recommend more palliative approach given her age and multiple 

complicated medical and high risk medication patient would need, however if 

patient gets worse definitely she would benefit from more less aggressive 

treatment approach

## 2025-01-01 LAB
ANION GAP SERPL CALC-SCNC: 13 MMOL/L
APTT BLD: 20.1 SEC (ref 22–30)
BASOPHILS # BLD AUTO: 0 K/UL (ref 0–0.2)
BASOPHILS # BLD AUTO: 0 K/UL (ref 0–0.2)
BASOPHILS NFR BLD AUTO: 0 %
BASOPHILS NFR BLD AUTO: 1 %
BUN SERPL-SCNC: 19 MG/DL (ref 7–17)
CALCIUM SPEC-MCNC: 9.7 MG/DL (ref 8.4–10.2)
CHLORIDE SERPL-SCNC: 107 MMOL/L (ref 98–107)
CO2 SERPL-SCNC: 22 MMOL/L (ref 22–30)
EOSINOPHIL # BLD AUTO: 0.2 K/UL (ref 0–0.7)
EOSINOPHIL # BLD AUTO: 0.2 K/UL (ref 0–0.7)
EOSINOPHIL NFR BLD AUTO: 2 %
EOSINOPHIL NFR BLD AUTO: 3 %
ERYTHROCYTE [DISTWIDTH] IN BLOOD BY AUTOMATED COUNT: 4.46 M/UL (ref 3.8–5.4)
ERYTHROCYTE [DISTWIDTH] IN BLOOD BY AUTOMATED COUNT: 4.66 M/UL (ref 3.8–5.4)
ERYTHROCYTE [DISTWIDTH] IN BLOOD: 12.1 % (ref 11.5–15.5)
ERYTHROCYTE [DISTWIDTH] IN BLOOD: 12.1 % (ref 11.5–15.5)
GLUCOSE BLD-MCNC: 153 MG/DL (ref 70–110)
GLUCOSE BLD-MCNC: 173 MG/DL (ref 70–110)
GLUCOSE BLD-MCNC: 285 MG/DL (ref 70–110)
GLUCOSE BLD-MCNC: 92 MG/DL (ref 70–110)
GLUCOSE SERPL-MCNC: 129 MG/DL (ref 74–99)
HCT VFR BLD AUTO: 42.5 % (ref 34–46)
HCT VFR BLD AUTO: 45.6 % (ref 34–46)
HGB BLD-MCNC: 13.8 GM/DL (ref 11.4–16)
HGB BLD-MCNC: 14.5 GM/DL (ref 11.4–16)
INR PPP: 1 (ref ?–1.2)
LYMPHOCYTES # SPEC AUTO: 0.9 K/UL (ref 1–4.8)
LYMPHOCYTES # SPEC AUTO: 0.9 K/UL (ref 1–4.8)
LYMPHOCYTES NFR SPEC AUTO: 12 %
LYMPHOCYTES NFR SPEC AUTO: 8 %
MCH RBC QN AUTO: 30.9 PG (ref 25–35)
MCH RBC QN AUTO: 31.1 PG (ref 25–35)
MCHC RBC AUTO-ENTMCNC: 31.8 G/DL (ref 31–37)
MCHC RBC AUTO-ENTMCNC: 32.4 G/DL (ref 31–37)
MCV RBC AUTO: 95.4 FL (ref 80–100)
MCV RBC AUTO: 97.9 FL (ref 80–100)
MONOCYTES # BLD AUTO: 0.5 K/UL (ref 0–1)
MONOCYTES # BLD AUTO: 0.5 K/UL (ref 0–1)
MONOCYTES NFR BLD AUTO: 5 %
MONOCYTES NFR BLD AUTO: 8 %
NEUTROPHILS # BLD AUTO: 5.2 K/UL (ref 1.3–7.7)
NEUTROPHILS # BLD AUTO: 8.6 K/UL (ref 1.3–7.7)
NEUTROPHILS NFR BLD AUTO: 74 %
NEUTROPHILS NFR BLD AUTO: 83 %
PLATELET # BLD AUTO: 251 K/UL (ref 150–450)
PLATELET # BLD AUTO: 260 K/UL (ref 150–450)
POTASSIUM SERPL-SCNC: 3.7 MMOL/L (ref 3.5–5.1)
PT BLD: 10.7 SEC (ref 10–12.5)
SODIUM SERPL-SCNC: 142 MMOL/L (ref 137–145)
WBC # BLD AUTO: 10.4 K/UL (ref 3.8–10.6)
WBC # BLD AUTO: 7.1 K/UL (ref 3.8–10.6)

## 2025-01-01 RX ADMIN — HEPARIN SODIUM ONE: 1000 INJECTION, SOLUTION INTRAVENOUS; SUBCUTANEOUS at 15:27

## 2025-01-01 RX ADMIN — METOPROLOL SUCCINATE SCH MG: 25 TABLET, EXTENDED RELEASE ORAL at 09:35

## 2025-01-01 RX ADMIN — HEPARIN SODIUM PRN UNIT: 1000 INJECTION, SOLUTION INTRAVENOUS; SUBCUTANEOUS at 21:16

## 2025-01-01 RX ADMIN — INSULIN ASPART SCH UNIT: 100 INJECTION, SOLUTION INTRAVENOUS; SUBCUTANEOUS at 22:46

## 2025-01-01 RX ADMIN — HEPARIN SODIUM SCH MLS/HR: 10000 INJECTION, SOLUTION INTRAVENOUS at 14:58

## 2025-01-01 RX ADMIN — HEPARIN SODIUM SCH: 10000 INJECTION, SOLUTION INTRAVENOUS at 15:26

## 2025-01-01 RX ADMIN — HEPARIN SODIUM ONE: 1000 INJECTION, SOLUTION INTRAVENOUS; SUBCUTANEOUS at 14:58

## 2025-01-01 NOTE — P.CRDCN
History of Present Illness


Consult date: 01/01/25


History of present illness: 





HISTORY OF PRESENTING ILLNESS:


89-year-old prior history of CVA 2 years ago with expressive aphasia.  She 

received tPA at that time.  She was attempted for thrombectomy however it was 

aborted because of severe femoral artery disease bilaterally.


Patient presented to the hospital with expressive aphasia and word finding 

difficulty.  Her hospital workup showed concerns of acute/subacute stroke 

affecting the left temporal and parietal lobe based off brain MRI.  Cardiology 

was consulted for further management of CVA





Admission Cardiac Labs: Troponin 0.013, repeat 0.085





Admission testing:


Admission ECG shows sinus rhythm with PACs.  Nonspecific ST changes in inferior 

leads





MRI brain acute/subacute ischemia in left parietal and temporal lobe


2D echo EF 55%, mild aortic stenosis, no significant other valvular dysfunction,

no significant regional wall motion abnormality





CTA head neck, complete occlusion of left M2 segment of MCA, 60 to 70% proximal 

right ICA stenosis, no flow-limiting stenosis in left ICA.  High-grade stenosis 

of left subclavian artery origin due to dense calcific plaque.








REVIEW OF SYSTEMS:


14 point review of system is negative except what is mentioned above in HPI.





PHYSICAL EXAMINATION:


Neck: Brisk carotid upstroke, no jugular venous distention.


Lungs: Clear to auscultation.


Heart: Regular rate and rhythm, S1-S2, , no murmur or rub.


Abdomen: Soft nontender, positive bowel sounds.


Extremities: No edema, intact distal pulses.


Neuro: Drowsy but arousable. Detailed neuro exam was not performed. 





ASSESSMENT:


# Acute CVA with expressive aphasia


# Prior history of CVA with expressive aphasia


# History of CAD status post CABG


# Type 2 diabetes, HbA1c 7.5


# Dyslipidemia, 


# Right ICA 60 to 70% disease, 100% occlusion of left M2 MCA.  Mild right ICA 

disease


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


# Ex-tobacco user


# PAD with bilateral femoral artery occlusion








PLAN:


# No need for transesophageal echocardiogram as it will not .  

She is not a candidate for PFO closure


# Recommend 30-day event monitor on discharge.  Continue telemetry monitoring in

hospital.  If patient is not able to keep 30-day event monitor on, consider loop

recorder especially because this is her second event of stroke


# Continue anticoagulation for DVT.  Continue aspirin Lipitor.


# Recommend outpatient follow-up.  


# Recommend outpatient Lexiscan stress test depending on patient's clinical 

course to evaluate if LIMA graft is being compromised because of left subclavian

stenosis noticed on chest CT.





Dawood Badillo MD, FACC, RPVI


Thank you for allowing [facility] to participate in 

this patient's care. Feel free to reach out in case of any followup questions. 





Past Medical History


Past Medical History: Diabetes Mellitus, Hyperlipidemia, Hypertension


History of Any Multi-Drug Resistant Organisms: None Reported


Past Surgical History: Coronary Bypass/CABG


Past Psychological History: No Psychological Hx Reported


Smoking Status: Former smoker


Past Alcohol Use History: None Reported


Past Drug Use History: None Reported





Medications and Allergies


                                Home Medications











 Medication  Instructions  Recorded  Confirmed  Type


 


Aspirin EC [Ecotrin Low Dose] 81 mg PO DAILY 12/28/24 12/28/24 History


 


Atorvastatin [Lipitor] 80 mg PO DAILY 12/28/24 12/28/24 History


 


Ezetimibe [Zetia] 10 mg PO DAILY 12/28/24 12/28/24 History


 


Losartan Potassium [Cozaar] 100 mg PO DAILY 12/28/24 12/28/24 History


 


Metoprolol Succinate (ER) [Toprol 25 mg PO DAILY 12/28/24 12/28/24 History





Xl]    


 


Spironolactone [Aldactone] 25 mg PO DAILY 12/28/24 12/28/24 History


 


amLODIPine [Norvasc] 5 mg PO DAILY 12/28/24 12/28/24 History


 


hydroCHLOROthiazide [Hydrodiuril] 25 mg PO DAILY 12/28/24 12/28/24 History


 


metFORMIN HCL [Glucophage] 1,000 mg PO BID 12/28/24 12/28/24 History








                                    Allergies











Allergy/AdvReac Type Severity Reaction Status Date / Time


 


No Known Allergies Allergy   Verified 12/27/24 17:49














Physical Exam


Vitals: 


                                   Vital Signs











  Temp Pulse Resp BP Pulse Ox


 


 01/01/25 12:22   46 L   


 


 01/01/25 08:00   46 L  18  164/72  96


 


 01/01/25 04:00  97.5 F L  52 L  18  111/77  97


 


 01/01/25 02:00   43 L  18  


 


 12/31/24 23:22  97.4 F L  43 L  18  112/72  95


 


 12/31/24 20:00  98.0 F  101 H  18  127/81  95


 


 12/31/24 16:00   65  18  123/64  99








                                Intake and Output











 01/01/25 01/01/25 01/01/25





 06:59 14:59 22:59


 


Intake Total 10 138 


 


Output Total  250 


 


Balance 10 -112 


 


Intake:   


 


  IV 10 20 


 


    Invasive Line 3 10 20 


 


  Oral  118 


 


Output:   


 


  Urine  250 


 


Other:   


 


  Voiding Method Bedside Commode  





 Diaper  


 


  # Voids 0  


 


  Weight 66.5 kg  














Results





                                 01/01/25 14:35





                                 01/01/25 07:10


                                       CBC











  01/01/25 01/01/25 Range/Units





  07:10 14:35 


 


WBC  7.1  10.4  (3.8-10.6)  k/uL


 


RBC  4.46  4.66  (3.80-5.40)  m/uL


 


Hgb  13.8  14.5  (11.4-16.0)  gm/dL


 


Hct  42.5  45.6  (34.0-46.0)  %


 


Plt Count  251  260  (150-450)  k/uL








                          Comprehensive Metabolic Panel











  01/01/25 Range/Units





  07:10 


 


Sodium  142  (137-145)  mmol/L


 


Potassium  3.7  (3.5-5.1)  mmol/L


 


Chloride  107  ()  mmol/L


 


Carbon Dioxide  22  (22-30)  mmol/L


 


BUN  19 H  (7-17)  mg/dL


 


Creatinine  0.93  (0.52-1.04)  mg/dL


 


Glucose  129 H  (74-99)  mg/dL


 


Calcium  9.7  (8.4-10.2)  mg/dL








                               Current Medications











Generic Name Dose Route Start Last Admin





  Trade Name Freq  PRN Reason Stop Dose Admin


 


Amlodipine Besylate  5 mg  12/29/24 09:00  01/01/25 09:35





  Amlodipine 5 Mg Tab  PO   5 mg





  DAILY WILLEM   Administration


 


Aspirin  81 mg  12/29/24 09:00  01/01/25 09:35





  Aspirin 81 Mg  PO   81 mg





  DAILY WILLEM   Administration


 


Atorvastatin Calcium  80 mg  12/27/24 21:00  12/31/24 20:58





  Atorvastatin 80 Mg Tab  PO   Not Given





  HS WILLEM  


 


Ezetimibe  10 mg  12/29/24 09:00  01/01/25 09:35





  Ezetimibe 10 Mg Tab  PO   10 mg





  DAILY WILLEM   Administration


 


Haloperidol Lactate  3 mg  12/28/24 19:01  12/31/24 17:00





  Haloperidol Lactate 5 Mg/Ml 1 Ml Vial  IM   3 mg





  Q6HR PRN   Administration





  Agitation or Acute Psychosis  


 


Heparin Sodium (Porcine)  0 unit  01/01/25 13:50 





  Heparin Sodium 1,000 Un/Ml (10ml Vl)  IV  





  PER PROTOCOL PRN  





  Low PTT  





  Protocol  


 


Heparin Sodium (Porcine)  0 unit  01/01/25 14:07 





  Heparin Sodium 1,000 Un/Ml (10ml Vl)  IV  





  PER PROTOCOL PRN  





  Low PTT  





  Protocol  


 


Sodium Chloride  1,000 mls @ 50 mls/hr  12/27/24 20:45  01/01/25 05:18





  Saline 0.9%  IV   Not Given





  .Q20H WILLEM  


 


Heparin Sodium/Sodium Chloride  250 mls @ 7.98 mls/hr  01/01/25 14:15  01/01/25 

14:58





  25,000 unit/ Sodium Chloride  IV   12 units/kg/hr





  .Q24H WILLEM   7.98 mls/hr





    Administration





  Protocol  





  12 UNITS/KG/HR  


 


Lorazepam  1 mg  12/27/24 23:25  12/31/24 15:33





  Lorazepam 2 Mg/Ml Inj  IV   1 mg





  Q2HR PRN   Administration





  Agitation  


 


Losartan Potassium  100 mg  12/29/24 09:00  01/01/25 09:35





  Losartan 50 Mg Tab  PO   100 mg





  DAILY WILLEM   Administration


 


Metoprolol Succinate  12.5 mg  01/01/25 09:00  01/01/25 09:35





  Metoprolol Succinate (Er) 25 Mg Tab.Er.24h  PO   12.5 mg





  DAILY WILLEM   Administration


 


Pantoprazole Sodium  40 mg  12/29/24 07:30  01/01/25 09:35





  Pantoprazole 40 Mg Tablet  PO   40 mg





  AC-BRKFST WILLEM   Administration


 


Spironolactone  25 mg  12/29/24 09:00  01/01/25 09:35





  Spironolactone 25 Mg Tab  PO   25 mg





  DAILY WILLEM   Administration








                                Intake and Output











 01/01/25 01/01/25 01/01/25





 06:59 14:59 22:59


 


Intake Total 10 138 


 


Output Total  250 


 


Balance 10 -112 


 


Intake:   


 


  IV 10 20 


 


    Invasive Line 3 10 20 


 


  Oral  118 


 


Output:   


 


  Urine  250 


 


Other:   


 


  Voiding Method Bedside Commode  





 Diaper  


 


  # Voids 0  


 


  Weight 66.5 kg  








                                        





                                 01/01/25 14:35 





                                 01/01/25 07:10

## 2025-01-01 NOTE — US
EXAMINATION TYPE: US venous doppler duplex UE BI

 

DATE OF EXAM: 1/1/2025

 

COMPARISON: NONE

 

CLINICAL INDICATION: Female, 89 years old with history of embolic stroke; stroke

 

TECHNIQUE: Grayscale, color Doppler and spectral Doppler imaging of the upper extremity.

 

SIDE PERFORMED: bilateral 

 

FINDINGS:

 

**limitations due to patient cooperation

 

Right Arm: no evidence of DVT as visualized

 

Left Arm: no evidence of DVT as visualized. limited visualization at radial and ulnar veins due to ba
ndage left forearm 

 

Grayscale, color doppler, spectral doppler imaging performed of the deep veins of the upper extremiti
es.  

 

IMPRESSION: 

 

1. Upper extremity negative for deep venous thrombosis. There are some limitations discussed above

 

[facility], Workstation: [facility], 1/1/2025 11:20 AM

## 2025-01-01 NOTE — P.PN
Progress Note - Text


Progress Note Date: 01/01/25





Discussion with patient's son regarding possible carotid intervention.





Currently the son is unsure as to whether or not he would prefer to pursue 

carotid intervention.  Will check back with the patient and her family in a few 

days in this regard.  All questions were answered to patient's son satisfaction.

## 2025-01-01 NOTE — P.PN
Subjective


Progress Note Date: 01/01/24


I am following-up with patient and per son who is at bedside she is about the 

same.  She had duplex and it showed +ve DVT of the right extremity.








Objective





- Vital Signs


Vital signs: 


                                   Vital Signs











Temp  97.5 F L  01/01/25 04:00


 


Pulse  46 L  01/01/25 12:22


 


Resp  18   01/01/25 08:00


 


BP  164/72   01/01/25 08:00


 


Pulse Ox  96   01/01/25 08:00


 


FiO2      








                                 Intake & Output











 12/31/24 01/01/25 01/01/25





 18:59 06:59 18:59


 


Intake Total 440 20 138


 


Output Total  0 


 


Balance 440 20 138


 


Weight  66.5 kg 


 


Intake:   


 


  IV 20 20 20


 


    Invasive Line 3 20 20 20


 


  Oral 420  118


 


Output:   


 


  Urine  0 


 


Other:   


 


  Voiding Method Bedside Commode Bedside Commode 





 Diaper Diaper 


 


  # Voids  0 


 


  # Bowel Movements 1  














- Exam


General: Sitting in a recliner chair and is not in acute distress.


Neuro:


Limited.


Has severe aphasia appears her speech is extremely garbled.  She states she was 

well and correctly stated her name.  She followed some simple commands by 

mimicking me such as lifting extremity above gravity upper and lower and appears

symmetrical.  No facial droop.  No dysarthria.





- Labs


CBC & Chem 7: 


                                 01/01/25 07:10





                                 01/01/25 07:10


Labs: 


                  Abnormal Lab Results - Last 24 Hours (Table)











  12/31/24 12/31/24 01/01/25 Range/Units





  16:17 20:42 06:12 


 


Lymphocytes #     (1.0-4.8)  k/uL


 


BUN     (7-17)  mg/dL


 


Glucose     (74-99)  mg/dL


 


POC Glucose (mg/dL)  216 H  126 H  153 H  ()  mg/dL














  01/01/25 01/01/25 Range/Units





  07:10 07:10 


 


Lymphocytes #  0.9 L   (1.0-4.8)  k/uL


 


BUN   19 H  (7-17)  mg/dL


 


Glucose   129 H  (74-99)  mg/dL


 


POC Glucose (mg/dL)    ()  mg/dL














Assessment and Plan


Assessment: 





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  Patient has acute 

  to subacute stroke over the left parietal and temporal region.  As well as has

  subacute appearing region in the medial aspect of the right frontal etiology 

  unknown but seems embolic in nature.  Rule out any A-fib or flutter.  Patient 

  was not a candidate for TNK because she came outside the window.  Has +ve DVT 

  in right upper and mild right popliteal vein (lower is limited). 


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

  hronic, as was seen with the previous images 12/16/2022.  


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 

  calcified plaque.


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 

  attempted thrombectomy was was not successful because of bilateral femoral 

  occlusions.


* Mild renal insufficiency


* Elevated cardiac enzymes


* Hyperlipidemia


* Ex tobacco use


* Hypertension 


* Diabetes





Plan: 





* MRI brain is reported as acute/subacute ischemic identified throughout the 

  left parietal and temporal lobe.  Additional more subacute appearing lesion in

  the medial aspect of the right frontal lobe.  Raises concern for possible 

  embolic phenomenon.  Mild hemosiderin deposition within the medial aspect of 

  the right frontal and with left temporal lobe.


* 2-D echo: Read as technically difficult study for agitation.  Normal left 

  ventricle and right ventricle systolic function.  Aortic sclerosis with mild 

  stenosis.


* CTA head and neck showed: Complete occlusion of the left M2 segment of the 

  middle cerebral artery within the sylvian fissure.  This finding could be 

  acute or chronic and overall, appears similar to prior study 12/16/2022.  No 

  evidence of dissection involving the cervical internal carotid arteries.  

  Approximately 60-70% stenosis of the proximal right ICA due to dense calcified

  plaque.  No flow limiting stenosis of the left ICA.  High-grade stenosis of 

  the left subclavian artery origin due to dense calcified plaque.  Neur

  ointervention do not recommend any intervention because of patient having 

  chronic femoral occlusions (as per formation from previous admission).


* vascular surgery for high-grade stenosis of the left subclavian artery.  


* Fasting a.m. lipid panel with cholesterol 295, , HDL 72 and 

  triglycerides 80.0.  Continue Lipitor 80 mg and Zetia 10 mg.  Uncertain if 

  patient is compliant, as lipids are poorly controlled.


* Hemoglobin A1c: 7.5


* Venous duplex of the right is reported as positive for DVT at the upper and 

  mild right popliteal vein, extremely limited exam due to combative patient.  

  Left lower extremity no evidence for DVT


* Recommend normotensive blood pressure.


* Venous duplex there are some limitation of the upper extremity that is 

  reported on the other report.


* Patient was on aspirin 81 mg daily.  Now patient started on Brilinta 90 mg 

  twice daily.  


* Recommend transesophageal echocardiogram to rule out any PFO or thrombus 

  especially since the 2D echo was limited and unknown source of the stroke.  

  Also recommend 30-day event monitor and venous duplex of extremities to rule 

  out any DVT.  I notified about the workup to the patient's son who is at 

  bedside and he was in agreement of the workup.  I consulted cardiology team.


* Patient has positive DVT.  Will avoid anticoagulation at this time because of 

  risk of a hemorrhagic bleed.  But if the benefit outweighed the risk then 

  recommend heparin drip and avoid boluses and keep PTT between 45 and 60.


* Neuro checks every 4 hours.


* Telemetry monitoring rule out any arrhythmia


* PT, OT, speech therapy


* DVT prophylaxis: Heparin 5000 units subcu every 12 hours





The plan discussed with the patient son was at bedside, as well as the nurse





Time with Patient: Less than 30

## 2025-01-01 NOTE — P.PN
Subjective





This is an 89-year-old female who was recently admitted with expressive aphasia 

undergoing neurological workup with neuro following.  Patient scheduled to 

undergo MRI which is pending at this time.  Vascular surgery consulted for 

carotid stenosis evaluation and also patient is scheduled to undergo 2D echo 

with bubble study.  Patient is afebrile with no reports of chest pain or 

shortness of breath at this time.  Patient continued on aspirin and Brilinta as 

well as statin therapy and will continue.  Recommend PT/OT therapy evaluation.





12/13


Patient presents with aphasia and confusion suspicious for acute stroke, she had

MRI of the brain today showing acute/subacute ischemic foci of the left parietal

and temporal lobe with embolic phenomena is suspected.  I discussed the case 

with the neurology service.  FOX is recommended and cardiology team were 

consulted.


Further workup recommended by neurologist including ultrasound of the leg which 

came back positive for DVT in the right lower extremity.  We are going to start 

heparin drip once cleared by neurology given her acute stroke.  Family to be 

informed, discussed with the staff.


Patient is on aspirin and Brilinta, however if you start heparin drip may hold 

one of the antiplatelet medication to decrease risk of bleeding


However overall prognosis is guarded and patient will get worse I would 

recommend to go more with palliative approach to treat her given her age and 

multiple and complicated medical illness.


Patient herself was still confused, able to talk, follow simple commands but not

others.


Denies chest pain or dyspnea.  No other new complaint.


Her blood pressure was on the low side and was bradycardic down to 40s, we 

lowered her metoprolol dose 25 mg down to 12.5 mg and held her 

hydrochlorothiazide 25 mg


Patient currently on metoprolol 12.5 mg, lis losartan 100 mg and 

Norvasc/amlodipine 5 mg





1/41/25


Patient still is confused since admission but no change from yesterday.  This 

was confirmed with the bedside son Eugene.


Patient also with no other chest pain or dyspnea.  No hypoxia


Ultrasound of the leg ordered yesterday as part of the workup for her stroke and

came back positive for right leg DVT.  Patient also with acute stroke, therefore

we discussed the case with neurology service who cleared her to start on heparin

drip per staff.


Currently kept on aspirin Brilinta and heparin drip with close monitoring


I discussed the management plan with the son and he is agreeable.  Risk of 

bleeding with anticoagulation is explained for him extensively and he verbalized

understanding and acceptance


Also patient may benefit from FOX and cardiology team are consulted








                                        


                               Active Medications











Generic Name Dose Route Start Last Admin





  Trade Name Halie  PRN Reason Stop Dose Admin


 


Amlodipine Besylate  5 mg  12/29/24 09:00  01/01/25 09:35





  Amlodipine 5 Mg Tab  PO   5 mg





  DAILY WILLEM   Administration


 


Aspirin  81 mg  12/29/24 09:00  01/01/25 09:35





  Aspirin 81 Mg  PO   81 mg





  DAILY WILLEM   Administration


 


Atorvastatin Calcium  80 mg  12/27/24 21:00  12/31/24 20:58





  Atorvastatin 80 Mg Tab  PO   Not Given





  HS WILLEM  


 


Ezetimibe  10 mg  12/29/24 09:00  01/01/25 09:35





  Ezetimibe 10 Mg Tab  PO   10 mg





  DAILY WILLEM   Administration


 


Haloperidol Lactate  3 mg  12/28/24 19:01  12/31/24 17:00





  Haloperidol Lactate 5 Mg/Ml 1 Ml Vial  IM   3 mg





  Q6HR PRN   Administration





  Agitation or Acute Psychosis  


 


Heparin Sodium (Porcine)  5,320 unit  01/01/25 13:50 





  Heparin Sodium 1,000 Un/Ml (10ml Vl)  80 unit/kg (5320 unit)  01/01/25 13:51 





  IV  





  ONCE ONE  


 


Heparin Sodium (Porcine)  0 unit  01/01/25 13:50 





  Heparin Sodium 1,000 Un/Ml (10ml Vl)  IV  





  PER PROTOCOL PRN  





  Low PTT  





  Protocol  


 


Sodium Chloride  1,000 mls @ 50 mls/hr  12/27/24 20:45  01/01/25 05:18





  Saline 0.9%  IV   Not Given





  .Q20H WILLEM  


 


Heparin Sodium/Sodium Chloride  250 mls @ 11.97 mls/hr  01/01/25 14:00 





  25,000 unit/ Sodium Chloride  IV  





  .[id number] Replaced by [facility]  





  Protocol  





  18 UNITS/KG/HR  


 


Lorazepam  1 mg  12/27/24 23:25  12/31/24 15:33





  Lorazepam 2 Mg/Ml Inj  IV   1 mg





  Q2HR PRN   Administration





  Agitation  


 


Losartan Potassium  100 mg  12/29/24 09:00  01/01/25 09:35





  Losartan 50 Mg Tab  PO   100 mg





  DAILY WILLEM   Administration


 


Metoprolol Succinate  12.5 mg  01/01/25 09:00  01/01/25 09:35





  Metoprolol Succinate (Er) 25 Mg Tab.Er.24h  PO   12.5 mg





  DAILY WILLEM   Administration


 


Pantoprazole Sodium  40 mg  12/29/24 07:30  01/01/25 09:35





  Pantoprazole 40 Mg Tablet  PO   40 mg





  AC-BRKFST WILLEM   Administration


 


Spironolactone  25 mg  12/29/24 09:00  01/01/25 09:35





  Spironolactone 25 Mg Tab  PO   25 mg





  DAILY WILLEM   Administration














Objective





- Vital Signs


Vital signs: 


                                   Vital Signs











Temp  97.5 F L  01/01/25 04:00


 


Pulse  46 L  01/01/25 12:22


 


Resp  18   01/01/25 08:00


 


BP  164/72   01/01/25 08:00


 


Pulse Ox  96   01/01/25 08:00


 


FiO2      








                                 Intake & Output











 12/31/24 01/01/25 01/01/25





 18:59 06:59 18:59


 


Intake Total 440 20 138


 


Output Total  0 


 


Balance 440 20 138


 


Weight  66.5 kg 


 


Intake:   


 


  IV 20 20 20


 


    Invasive Line 3 20 20 20


 


  Oral 420  118


 


Output:   


 


  Urine  0 


 


Other:   


 


  Voiding Method Bedside Commode Bedside Commode 





 Diaper Diaper 


 


  # Voids  0 


 


  # Bowel Movements 1  














- Exam





-GENERAL: The patient is awake but very weak and confused, she follows simple 

commands, she has some word finding difficulties, not in any acute distress. 

Well developed, well nourished. 


HEENT: Pupils are round and equally reacting to light. EOMI. No scleral icterus.

No conjunctival pallor. Normocephalic, atraumatic. No pharyngeal erythema. No 

thyromegaly. 


CARDIOVASCULAR: S1 and S2 present. No murmurs, rubs, or gallops. 


PULMONARY: Chest is clear to auscultation, no wheezing , no crackles. 


ABDOMEN: Soft, nontender, nondistended, normoactive bowel sounds. No palpable 

organomegaly. 


MUSCULOSKELETAL: No joint swelling or deformity. 


EXTREMITIES: No cyanosis, clubbing, or pedal edema. 


-NEUROLOGICAL: G cranial nerves are grossly intact, patient somewhat confused.  

Possible mild right side weakness


SKIN: No rashes. no petechiae.








- Labs


CBC & Chem 7: 


                                 01/01/25 07:10





                                 01/01/25 07:10


Labs: 


                  Abnormal Lab Results - Last 24 Hours (Table)











  12/31/24 12/31/24 01/01/25 Range/Units





  16:17 20:42 06:12 


 


Lymphocytes #     (1.0-4.8)  k/uL


 


BUN     (7-17)  mg/dL


 


Glucose     (74-99)  mg/dL


 


POC Glucose (mg/dL)  216 H  126 H  153 H  ()  mg/dL














  01/01/25 01/01/25 Range/Units





  07:10 07:10 


 


Lymphocytes #  0.9 L   (1.0-4.8)  k/uL


 


BUN   19 H  (7-17)  mg/dL


 


Glucose   129 H  (74-99)  mg/dL


 


POC Glucose (mg/dL)    ()  mg/dL














Assessment and Plan


Assessment: 





-Acute confusion and aphasia with possible right hemiparesis secondary to 

Acute/subacute stroke with MRI showing acute/subacute ischemic foci of the left 

parietal and temporal lobe with embolic phenomena


-Acute DVT of the right lower extremity


-Vascular disease with complete occlusion of the left M2 segment of middle 

cerebral artery, right internal carotid artery stenosis 60 to 70%, and high-

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


-Elevated troponin


-Altered mental status most likely vascular dementia and possible elements of 

the high mild dementia complicated by stroke as above


-Hypertension, currently better controlled


-Diabetes mellitus type 2














Plan: 





Continue with dual antiplatelet therapy of aspirin and Brilinta, however if you 

are going to start heparin drip after cleared by neurology service


Please discuss with the neurology team about clearance for starting 

anticoagulation with heparin drip


Consult cardiology team for FOX given possible embolic stroke


Vascular surgery team on consult for several vascular diseases including the 

middle cerebral artery, right internal carotid artery and left subclavian artery


Labs and medication were reviewed..  Continue same treatment.  Continue with 

symptomatic treatment.  Resume home medication.  Monitor labs and vitals.  DVT 

and GI prophylaxis.  Further recommendations as per clinical course of the 

patient


DVT prophylaxis:  heparin


GI Prophylaxis: Pepcid


PT/OT: Pending


Prognosis is guarded


I would recommend more palliative approach given her age and multiple 

complicated medical and high risk medication patient would need, however if 

patient gets worse definitely she would benefit from more less aggressive 

treatment approach

## 2025-01-02 VITALS — RESPIRATION RATE: 16 BRPM

## 2025-01-02 LAB
APTT BLD: 82.7 SEC (ref 22–30)
BASOPHILS # BLD AUTO: 0 K/UL (ref 0–0.2)
BASOPHILS NFR BLD AUTO: 0 %
EOSINOPHIL # BLD AUTO: 0.2 K/UL (ref 0–0.7)
EOSINOPHIL NFR BLD AUTO: 2 %
ERYTHROCYTE [DISTWIDTH] IN BLOOD BY AUTOMATED COUNT: 4.27 M/UL (ref 3.8–5.4)
ERYTHROCYTE [DISTWIDTH] IN BLOOD: 12.3 % (ref 11.5–15.5)
GLUCOSE BLD-MCNC: 137 MG/DL (ref 70–110)
GLUCOSE BLD-MCNC: 157 MG/DL (ref 70–110)
GLUCOSE BLD-MCNC: 166 MG/DL (ref 70–110)
GLUCOSE BLD-MCNC: 221 MG/DL (ref 70–110)
HCT VFR BLD AUTO: 41.3 % (ref 34–46)
HGB BLD-MCNC: 13.1 GM/DL (ref 11.4–16)
INR PPP: 1.1 (ref ?–1.2)
LYMPHOCYTES # SPEC AUTO: 1.3 K/UL (ref 1–4.8)
LYMPHOCYTES NFR SPEC AUTO: 15 %
MCH RBC QN AUTO: 30.8 PG (ref 25–35)
MCHC RBC AUTO-ENTMCNC: 31.9 G/DL (ref 31–37)
MCV RBC AUTO: 96.7 FL (ref 80–100)
MONOCYTES # BLD AUTO: 0.4 K/UL (ref 0–1)
MONOCYTES NFR BLD AUTO: 5 %
NEUTROPHILS # BLD AUTO: 6.1 K/UL (ref 1.3–7.7)
NEUTROPHILS NFR BLD AUTO: 74 %
PLATELET # BLD AUTO: 230 K/UL (ref 150–450)
PT BLD: 11.6 SEC (ref 10–12.5)
WBC # BLD AUTO: 8.3 K/UL (ref 3.8–10.6)

## 2025-01-02 NOTE — P.PN
Subjective





This is an 89-year-old female who was recently admitted with expressive aphasia 

undergoing neurological workup with neuro following.  Patient scheduled to 

undergo MRI which is pending at this time.  Vascular surgery consulted for 

carotid stenosis evaluation and also patient is scheduled to undergo 2D echo 

with bubble study.  Patient is afebrile with no reports of chest pain or 

shortness of breath at this time.  Patient continued on aspirin and Brilinta as 

well as statin therapy and will continue.  Recommend PT/OT therapy evaluation.





12/13


Patient presents with aphasia and confusion suspicious for acute stroke, she had

MRI of the brain today showing acute/subacute ischemic foci of the left parietal

and temporal lobe with embolic phenomena is suspected.  I discussed the case 

with the neurology service.  FOX is recommended and cardiology team were 

consulted.


Further workup recommended by neurologist including ultrasound of the leg which 

came back positive for DVT in the right lower extremity.  We are going to start 

heparin drip once cleared by neurology given her acute stroke.  Family to be 

informed, discussed with the staff.


Patient is on aspirin and Brilinta, however if you start heparin drip may hold 

one of the antiplatelet medication to decrease risk of bleeding


However overall prognosis is guarded and patient will get worse I would 

recommend to go more with palliative approach to treat her given her age and 

multiple and complicated medical illness.


Patient herself was still confused, able to talk, follow simple commands but not

others.


Denies chest pain or dyspnea.  No other new complaint.


Her blood pressure was on the low side and was bradycardic down to 40s, we 

lowered her metoprolol dose 25 mg down to 12.5 mg and held her 

hydrochlorothiazide 25 mg


Patient currently on metoprolol 12.5 mg, lis losartan 100 mg and 

Norvasc/amlodipine 5 mg





1/1/25


Patient still is confused since admission but no change from yesterday.  This 

was confirmed with the bedside son Eugene.


Patient also with no other chest pain or dyspnea.  No hypoxia


Ultrasound of the leg ordered yesterday as part of the workup for her stroke and

came back positive for right leg DVT.  Patient also with acute stroke, therefore

we discussed the case with neurology service who cleared her to start on heparin

drip per staff.


Currently kept on aspirin Brilinta and heparin drip with close monitoring


I discussed the management plan with the son and he is agreeable.  Risk of 

bleeding with anticoagulation is explained for him extensively and he verbalized

understanding and acceptance


Also patient may benefit from FOX and cardiology team are consulted





1/2


Patient remains clinically the same, she still confused but calm.


No chest pain or dyspnea


No right leg swelling or tenderness however ultrasound was positive for DVT and 

patient was started on heparin drip


At the same time she was continued on aspirin and Brilinta for her vascular 

disease.


Currently team were consulted for possible FOX however they recommended no need 

for the procedure as she is a poor surgical candidate if any abnormality found


They recommend stress test as an outpatient and event monitor upon discharge


Patient has poor appetite and not eating well, nutrition team were consulted











Review of systems


HEENT: No recent visual problems or hearing problems. Denied any sore throat. 


CARDIOVASCULAR: No  orthopnea, PND, no palpitations, no syncope. 


PULMONARY: No shortness of breath, no cough, no hemoptysis. 


GENITOURINARY: Denies any burning micturition, frequency, or urgency. 


MUSCULOSKELETAL/RHEUMATOLOGICAL: Denies any joint pain, swelling, or any muscle 

pain. 


ENDOCRINE: Denies any polyuria or polydipsia.





                               Active Medications











Generic Name Dose Route Start Last Admin





  Trade Name Halie  PRN Reason Stop Dose Admin


 


Amlodipine Besylate  5 mg  12/29/24 09:00  01/02/25 08:12





  Amlodipine 5 Mg Tab  PO   5 mg





  DAILY WILLEM   Administration


 


Aspirin  81 mg  12/29/24 09:00  01/02/25 08:11





  Aspirin 81 Mg  PO   81 mg





  DAILY WILLEM   Administration


 


Atorvastatin Calcium  80 mg  12/27/24 21:00  01/01/25 21:16





  Atorvastatin 80 Mg Tab  PO   80 mg





  HS WILLEM   Administration


 


Dextrose/Water  25 ml  01/01/25 22:27 





  Dextrose 50% Syringe 50 Ml  IVP  





  PER PROTOCOL PRN  





  Hypoglycemia  





  Protocol  


 


Dextrose/Water  50 ml  01/01/25 22:27 





  Dextrose 50% Syringe 50 Ml  IVP  





  PER PROTOCOL PRN  





  Hypoglycemia  





  Protocol  


 


Ezetimibe  10 mg  12/29/24 09:00  01/02/25 08:12





  Ezetimibe 10 Mg Tab  PO   10 mg





  DAILY WILLEM   Administration


 


Haloperidol Lactate  3 mg  12/28/24 19:01  12/31/24 17:00





  Haloperidol Lactate 5 Mg/Ml 1 Ml Vial  IM   3 mg





  Q6HR PRN   Administration





  Agitation or Acute Psychosis  


 


Heparin Sodium (Porcine)  0 unit  01/01/25 13:50  01/01/25 21:16





  Heparin Sodium 1,000 Un/Ml (10ml Vl)  IV   2,600 unit





  PER PROTOCOL PRN   Administration





  Low PTT  





  Protocol  


 


Heparin Sodium (Porcine)  0 unit  01/01/25 14:07 





  Heparin Sodium 1,000 Un/Ml (10ml Vl)  IV  





  PER PROTOCOL PRN  





  Low PTT  





  Protocol  


 


Sodium Chloride  1,000 mls @ 50 mls/hr  12/27/24 20:45  01/01/25 23:08





  Saline 0.9%  IV   Not Given





  .Q20H WILLEM  


 


Heparin Sodium/Sodium Chloride  250 mls @ 7.98 mls/hr  01/01/25 14:15  01/02/25 

04:44





  25,000 unit/ Sodium Chloride  IV   12 units/kg/hr





  .Q24H WILLEM   7.98 mls/hr





    Titration





  Protocol  





  12 UNITS/KG/HR  


 


Insulin Aspart  0 unit  01/01/25 22:27  01/02/25 13:24





  Insulin Aspart (Novolog) 100 Unit/Ml Vial  SQ   2 unit





  ACHS WILLEM   Administration





  Protocol  


 


Lorazepam  1 mg  12/27/24 23:25  12/31/24 15:33





  Lorazepam 2 Mg/Ml Inj  IV   1 mg





  Q2HR PRN   Administration





  Agitation  


 


Losartan Potassium  100 mg  12/29/24 09:00  01/02/25 08:11





  Losartan 50 Mg Tab  PO   100 mg





  DAILY WILLEM   Administration


 


Metoprolol Succinate  12.5 mg  01/01/25 09:00  01/02/25 10:24





  Metoprolol Succinate (Er) 25 Mg Tab.Er.24h  PO   Not Given





  DAILY WILLEM  


 


Pantoprazole Sodium  40 mg  12/29/24 07:30  01/02/25 06:04





  Pantoprazole 40 Mg Tablet  PO   40 mg





  AC-BRKFST WILLEM   Administration


 


Spironolactone  25 mg  12/29/24 09:00  01/02/25 08:11





  Spironolactone 25 Mg Tab  PO   25 mg





  DAILY WILLEM   Administration




















                                        


                                        





Objective





- Vital Signs


Vital signs: 


                                   Vital Signs











Temp  98.2 F   01/02/25 11:20


 


Pulse  58 L  01/02/25 11:20


 


Resp  16   01/02/25 11:20


 


BP  168/67   01/02/25 11:20


 


Pulse Ox  98   01/02/25 11:20


 


FiO2      








                                 Intake & Output











 01/01/25 01/02/25 01/02/25





 18:59 06:59 18:59


 


Intake Total 138 239.789 250


 


Output Total 250 200 300


 


Balance -112 39.789 -50


 


Weight  65 kg 


 


Intake:   


 


  IV 20 20 10


 


    Invasive Line 3 20 20 10


 


  Intake, IV Titration  119.789 





  Amount   


 


    Heparin Sod,Pork in 0.45%  119.789 





    NaCl 25,000 unit In 0.45   





    % NaCl 1 250ml.bag @ 12   





    UNITS/KG/HR 7.98 mls/hr   





    IV .Q24H [facility] Rx#:   





    [id number]   


 


  Oral 118 100 240


 


Output:   


 


  Urine 250 200 300


 


Other:   


 


  Voiding Method  Bedside Commode Bedside Commode





  Diaper Diaper


 


  # Voids  1 














- Exam





-GENERAL: The patient is awake but very weak and confused, she follows simple 

commands, she has some word finding difficulties, not in any acute distress. 

Well developed, well nourished. 


HEENT: Pupils are round and equally reacting to light. EOMI. No scleral icterus.

No conjunctival pallor. Normocephalic, atraumatic. No pharyngeal erythema. No 

thyromegaly. 


CARDIOVASCULAR: S1 and S2 present. No murmurs, rubs, or gallops. 


PULMONARY: Chest is clear to auscultation, no wheezing , no crackles. 


ABDOMEN: Soft, nontender, nondistended, normoactive bowel sounds. No palpable 

organomegaly. 


MUSCULOSKELETAL: No joint swelling or deformity. 


EXTREMITIES: No cyanosis, clubbing, or pedal edema. 


-NEUROLOGICAL: G cranial nerves are grossly intact, patient somewhat confused.  

Possible mild right side weakness


SKIN: No rashes. no petechiae.








- Labs


CBC & Chem 7: 


                                 01/02/25 03:59





                                 01/01/25 07:10


Labs: 


                  Abnormal Lab Results - Last 24 Hours (Table)











  01/01/25 01/01/25 01/01/25 Range/Units





  15:38 16:10 19:43 


 


APTT  20.1 L   35.6 H  (22.0-30.0)  sec


 


POC Glucose (mg/dL)   173 H   ()  mg/dL


 


Hemoglobin A1c     (<=6.0)  %














  01/01/25 01/02/25 01/02/25 Range/Units





  19:55 03:52 03:59 


 


APTT   82.7 H   (22.0-30.0)  sec


 


POC Glucose (mg/dL)  285 H    ()  mg/dL


 


Hemoglobin A1c    7.2 H  (<=6.0)  %














  01/02/25 01/02/25 01/02/25 Range/Units





  05:56 10:22 11:28 


 


APTT   67.9 H   (22.0-30.0)  sec


 


POC Glucose (mg/dL)  166 H   221 H  ()  mg/dL


 


Hemoglobin A1c     (<=6.0)  %














Assessment and Plan


Assessment: 





-Acute confusion and aphasia with possible right hemiparesis secondary to 

Acute/subacute stroke with MRI showing acute/subacute ischemic foci of the left 

parietal and temporal lobe with embolic phenomena


-Acute DVT of the right lower extremity


-Vascular disease with complete occlusion of the left M2 segment of middle 

cerebral artery, right internal carotid artery stenosis 60 to 70%, and high-gr

marivel stenosis of the left subclavian artery


-Elevated troponin


-Altered mental status most likely vascular dementia and possible elements of 

the high mild dementia complicated by stroke as above


-Hypertension, currently better controlled


-Diabetes mellitus type 2














Plan: 





Continue with dual antiplatelet therapy of aspirin and Brilinta, however if you 

are going to start heparin drip after cleared by neurology service


Please discuss with the neurology team about clearance for starting 

anticoagulation with heparin drip


Consult cardiology team for FOX given possible embolic stroke


Vascular surgery team on consult for several vascular diseases including the 

middle cerebral artery, right internal carotid artery and left subclavian artery


Labs and medication were reviewed..  Continue same treatment.  Continue with 

symptomatic treatment.  Resume home medication.  Monitor labs and vitals.  DVT 

and GI prophylaxis.  Further recommendations as per clinical course of the 

patient


DVT prophylaxis:  heparin


GI Prophylaxis: Pepcid


PT/OT: Pending


Prognosis is guarded


I would recommend more palliative approach given her age and multiple 

complicated medical and high risk medication patient would need, however if 

patient gets worse definitely she would benefit from more less aggressive 

treatment approach

## 2025-01-02 NOTE — P.PN
Subjective


Progress Note Date: 01/02/25





HISTORY OF PRESENTING ILLNESS:


89-year-old prior history of CVA 2 years ago with expressive aphasia.  She 

received tPA at that time.  She was attempted for thrombectomy however it was 

aborted because of severe femoral artery disease bilaterally.


Patient presented to the hospital with expressive aphasia and word finding 

difficulty.  Her hospital workup showed concerns of acute/subacute stroke 

affecting the left temporal and parietal lobe based off brain MRI.  Cardiology 

was consulted for further management of CVA


Admission Cardiac Labs: Troponin 0.013, repeat 0.085


Admission testing:


Admission ECG shows sinus rhythm with PACs.  Nonspecific ST changes in inferior 

leads


MRI brain acute/subacute ischemia in left parietal and temporal lobe


2D echo EF 55%, mild aortic stenosis, no significant other valvular dysfunction,

no significant regional wall motion abnormality


CTA head neck, complete occlusion of left M2 segment of MCA, 60 to 70% proximal 

right ICA stenosis, no flow-limiting stenosis in left ICA.  High-grade stenosis 

of left subclavian artery origin due to dense calcific plaque.





1/2/2025


Patient seen and examined.  Patient denies having chest pain no shortness of 

breath.  Patient has been maintained on IV heparin.  No plan for FOX as patient 

is not a candidate for PFO closure.  We will make arrangements for 30-day event 

monitor at the time of discharge.  Blood pressure 160/68, heart rate 43, pulse 

ox 96% on room air.





PHYSICAL EXAMINATION:


Neck: Brisk carotid upstroke, no jugular venous distention.


Lungs: Clear to auscultation.


Heart: Regular rate and rhythm, S1-S2, , no murmur or rub.


Abdomen: Soft nontender, positive bowel sounds.


Extremities: No edema, intact distal pulses.





ASSESSMENT:


Acute CVA with expressive aphasia


Prior history of CVA with expressive aphasia


History of CAD status post CABG


Type 2 diabetes, HbA1c 7.5


Dyslipidemia, 


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  Mild right ICA 

disease


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


Ex-tobacco user


PAD with bilateral femoral artery occlusion





PLAN:


No need for transesophageal echocardiogram as it will not .  

She is not a candidate for PFO closure


Recommend 30-day event monitor on discharge, ordered.  


Continue anticoagulation for DVT.  The need for aspirin and anticoagulation to 

be addressed by neurology


Recommend outpatient follow-up.  


Recommend outpatient Lexiscan stress test depending on patient's clinical course

to evaluate if LIMA graft is being compromised because of left subclavian 

stenosis noticed on chest CT.





Nurse practitioner note has been reviewed, I agree with documented findings and 

plan of care.  Patient was seen and examined.








Objective





- Vital Signs


Vital signs: 


                                   Vital Signs











Temp  97.5 F L  01/02/25 04:00


 


Pulse  46 L  01/02/25 04:00


 


Resp  17   01/02/25 04:00


 


BP  102/65   01/02/25 04:00


 


Pulse Ox  93 L  01/02/25 04:00


 


FiO2      








                                 Intake & Output











 01/01/25 01/02/25 01/02/25





 18:59 06:59 18:59


 


Intake Total 138 239.789 


 


Output Total 250 200 


 


Balance -112 39.789 


 


Weight  65 kg 


 


Intake:   


 


  IV 20 20 


 


    Invasive Line 3 20 20 


 


  Intake, IV Titration  119.789 





  Amount   


 


    Heparin Sod,Pork in 0.45%  119.789 





    NaCl 25,000 unit In 0.45   





    % NaCl 1 250ml.bag @ 12   





    UNITS/KG/HR 7.98 mls/hr   





    IV .Q24H [facility] Rx#:   





    [id number]   


 


  Oral 118 100 


 


Output:   


 


  Urine 250 200 


 


Other:   


 


  Voiding Method  Bedside Commode 





  Diaper 


 


  # Voids  1 














- Labs


CBC & Chem 7: 


                                 01/02/25 03:59





                                 01/01/25 07:10


Labs: 


                  Abnormal Lab Results - Last 24 Hours (Table)











  01/01/25 01/01/25 01/01/25 Range/Units





  14:35 15:38 16:10 


 


Neutrophils #  8.6 H    (1.3-7.7)  k/uL


 


Lymphocytes #  0.9 L    (1.0-4.8)  k/uL


 


APTT   20.1 L   (22.0-30.0)  sec


 


POC Glucose (mg/dL)    173 H  ()  mg/dL














  01/01/25 01/01/25 01/02/25 Range/Units





  19:43 19:55 03:52 


 


Neutrophils #     (1.3-7.7)  k/uL


 


Lymphocytes #     (1.0-4.8)  k/uL


 


APTT  35.6 H   82.7 H  (22.0-30.0)  sec


 


POC Glucose (mg/dL)   285 H   ()  mg/dL














  01/02/25 Range/Units





  05:56 


 


Neutrophils #   (1.3-7.7)  k/uL


 


Lymphocytes #   (1.0-4.8)  k/uL


 


APTT   (22.0-30.0)  sec


 


POC Glucose (mg/dL)  166 H  ()  mg/dL

## 2025-01-02 NOTE — P.PN
Subjective


Progress Note Date: 01/02/25


I am following-up with patient and per the son who is at bedside patient is more

cooperative.  She continues to have speech difficulty.


She is on Heparin drip for DVT.


It seems cardiology evaluated her and recommended no FOX since they did not feel

it will  or is a candidate for PFO closure.








Objective





- Vital Signs


Vital signs: 


                                   Vital Signs











Temp  98.2 F   01/02/25 15:40


 


Pulse  56 L  01/02/25 15:40


 


Resp  16   01/02/25 15:40


 


BP  176/61   01/02/25 15:40


 


Pulse Ox  99   01/02/25 15:40


 


FiO2      








                                 Intake & Output











 01/01/25 01/02/25 01/02/25





 18:59 06:59 18:59


 


Intake Total 138 239.789 250


 


Output Total 250 200 300


 


Balance -112 39.789 -50


 


Weight  65 kg 


 


Intake:   


 


  IV 20 20 10


 


    Invasive Line 3 20 20 10


 


  Intake, IV Titration  119.789 





  Amount   


 


    Heparin Sod,Pork in 0.45%  119.789 





    NaCl 25,000 unit In 0.45   





    % NaCl 1 250ml.bag @ 12   





    UNITS/KG/HR 7.98 mls/hr   





    IV .Q24H [facility] Rx#:   





    [id number]   


 


  Oral 118 100 240


 


Output:   


 


  Urine 250 200 300


 


Other:   


 


  Voiding Method  Bedside Commode Bedside Commode





  Diaper Diaper


 


  # Voids  1 














- Exam


General: Lying in bed and is not in acute distress.


Neuro:


Limited.


Has severe aphasia appears her speech.  She states she was well and correctly 

stated her name.  She pointed that her son keeps her in check.  She followed 

some simple commands by mimicking me such as lifting extremity above gravity 

upper and lower and appears symmetrical.  No facial droop.  No dysarthria.





- Labs


CBC & Chem 7: 


                                 01/02/25 03:59





                                 01/01/25 07:10


Labs: 


                  Abnormal Lab Results - Last 24 Hours (Table)











  01/01/25 01/01/25 01/02/25 Range/Units





  19:43 19:55 03:52 


 


APTT  35.6 H   82.7 H  (22.0-30.0)  sec


 


POC Glucose (mg/dL)   285 H   ()  mg/dL


 


Hemoglobin A1c     (<=6.0)  %














  01/02/25 01/02/25 01/02/25 Range/Units





  03:59 05:56 10:22 


 


APTT    67.9 H  (22.0-30.0)  sec


 


POC Glucose (mg/dL)   166 H   ()  mg/dL


 


Hemoglobin A1c  7.2 H    (<=6.0)  %














  01/02/25 01/02/25 Range/Units





  11:28 16:37 


 


APTT    (22.0-30.0)  sec


 


POC Glucose (mg/dL)  221 H  157 H  ()  mg/dL


 


Hemoglobin A1c    (<=6.0)  %














Assessment and Plan


Assessment: 





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  Patient has acute 

  to subacute stroke over the left parietal and temporal region.  As well as has

  subacute appearing region in the medial aspect of the right frontal etiology 

  unknown but seems embolic in nature.  Rule out any A-fib or flutter.  Patient 

  was not a candidate for TNK because she came outside the window.  Has +ve DVT 

  in right upper and mild right popliteal vein (lower is limited). 


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 

  chronic, as was seen with the previous images 12/16/2022.  


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 

  calcified plaque.


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 

  attempted thrombectomy was was not successful because of bilateral femoral 

  occlusions.


* Mild renal insufficiency


* Elevated cardiac enzymes


* Hyperlipidemia


* Ex tobacco use


* Hypertension 


* Diabetes





Plan: 





* MRI brain is reported as acute/subacute ischemic identified throughout the 

  left parietal and temporal lobe.  Additional more subacute appearing lesion in

  the medial aspect of the right frontal lobe.  Raises concern for possible 

  embolic phenomenon.  Mild hemosiderin deposition within the medial aspect of 

  the right frontal and with left temporal lobe.


* 2-D echo: Read as technically difficult study for agitation.  Normal left 

  ventricle and right ventricle systolic function.  Aortic sclerosis with mild 

  stenosis.


* CTA head and neck showed: Complete occlusion of the left M2 segment of the 

  middle cerebral artery within the sylvian fissure.  This finding could be 

  acute or chronic and overall, appears similar to prior study 12/16/2022.  No 

  evidence of dissection involving the cervical internal carotid arteries.  

  Approximately 60-70% stenosis of the proximal right ICA due to dense calcified

  plaque.  No flow limiting stenosis of the left ICA.  High-grade stenosis of 

  the left subclavian artery origin due to dense calcified plaque.  

  Neurointervention do not recommend any intervention because of patient having 

  chronic femoral occlusions (as per formation from previous admission).


* vascular surgery for high-grade stenosis of the left subclavian artery.  


* Fasting a.m. lipid panel with cholesterol 295, , HDL 72 and 

  triglycerides 80.0.  Continue Lipitor 80 mg and Zetia 10 mg.  Uncertain if 

  patient is compliant, as lipids are poorly controlled.


* Hemoglobin A1c: 7.5


* Venous duplex of the right is reported as positive for DVT at the upper and 

  mild right popliteal vein, extremely limited exam due to combative patient.  

  Left lower extremity no evidence for DVT


* Recommend normotensive blood pressure.


* Venous duplex there are some limitation of the upper extremity that is 

  reported on the other report.


* Patient was on aspirin 81 mg daily and was started on Brilinta 90 mg twice 

  daily during this hospital visit.  Patient has positive DVT. She is on Heparin

  drip.  Since the patient is on anticoagulation will avoid dual antiplatelet to

  avoid the increased risk for bleed and according to the son patient bruises 

  easily therefore recommend avoiding Brilinta to avoid increases risk of a 

  bleed and continue aspirin 81 mg daily.  She will require Eliquis according to

  the primary attending because of her DVT.  I will get a repeat CT of the head 

  for tomorrow.


* cardiology evaluated her and recommended no FOX since they did not feel it 

  will  or is a candidate for PFO closure.


* Recommend a 30 day event monitor.   


* Neuro checks every 4 hours.


* Telemetry monitoring rule out any arrhythmia


* PT, OT, speech therapy


* DVT prophylaxis: Heparin 5000 units subcu every 12 hours





The plan discussed with the patient son was at bedside, as well as primary 

attending.





Time with Patient: Less than 30

## 2025-01-03 VITALS — DIASTOLIC BLOOD PRESSURE: 66 MMHG | TEMPERATURE: 98.3 F | HEART RATE: 70 BPM | SYSTOLIC BLOOD PRESSURE: 170 MMHG

## 2025-01-03 LAB
ANION GAP SERPL CALC-SCNC: 11 MMOL/L
BASOPHILS # BLD AUTO: 0 K/UL (ref 0–0.2)
BASOPHILS NFR BLD AUTO: 1 %
BUN SERPL-SCNC: 30 MG/DL (ref 7–17)
CALCIUM SPEC-MCNC: 9.6 MG/DL (ref 8.4–10.2)
CHLORIDE SERPL-SCNC: 109 MMOL/L (ref 98–107)
CO2 SERPL-SCNC: 21 MMOL/L (ref 22–30)
EOSINOPHIL # BLD AUTO: 0.2 K/UL (ref 0–0.7)
EOSINOPHIL NFR BLD AUTO: 3 %
ERYTHROCYTE [DISTWIDTH] IN BLOOD BY AUTOMATED COUNT: 4.47 M/UL (ref 3.8–5.4)
ERYTHROCYTE [DISTWIDTH] IN BLOOD: 12.1 % (ref 11.5–15.5)
GLUCOSE BLD-MCNC: 136 MG/DL (ref 70–110)
GLUCOSE BLD-MCNC: 160 MG/DL (ref 70–110)
GLUCOSE BLD-MCNC: 182 MG/DL (ref 70–110)
GLUCOSE SERPL-MCNC: 137 MG/DL (ref 74–99)
HCT VFR BLD AUTO: 42.6 % (ref 34–46)
HGB BLD-MCNC: 13.8 GM/DL (ref 11.4–16)
LYMPHOCYTES # SPEC AUTO: 1.5 K/UL (ref 1–4.8)
LYMPHOCYTES NFR SPEC AUTO: 19 %
MCH RBC QN AUTO: 30.9 PG (ref 25–35)
MCHC RBC AUTO-ENTMCNC: 32.3 G/DL (ref 31–37)
MCV RBC AUTO: 95.5 FL (ref 80–100)
MONOCYTES # BLD AUTO: 0.5 K/UL (ref 0–1)
MONOCYTES NFR BLD AUTO: 6 %
NEUTROPHILS # BLD AUTO: 5.2 K/UL (ref 1.3–7.7)
NEUTROPHILS NFR BLD AUTO: 68 %
PLATELET # BLD AUTO: 259 K/UL (ref 150–450)
POTASSIUM SERPL-SCNC: 4.1 MMOL/L (ref 3.5–5.1)
SODIUM SERPL-SCNC: 141 MMOL/L (ref 137–145)
WBC # BLD AUTO: 7.6 K/UL (ref 3.8–10.6)

## 2025-01-03 RX ADMIN — APIXABAN SCH MG: 5 TABLET, FILM COATED ORAL at 16:59

## 2025-01-03 NOTE — P.PN
Subjective


Progress Note Date: 01/03/25





HISTORY OF PRESENTING ILLNESS:


89-year-old prior history of CVA 2 years ago with expressive aphasia.  She 

received tPA at that time.  She was attempted for thrombectomy however it was 

aborted because of severe femoral artery disease bilaterally.


Patient presented to the hospital with expressive aphasia and word finding 

difficulty.  Her hospital workup showed concerns of acute/subacute stroke 

affecting the left temporal and parietal lobe based off brain MRI.  Cardiology 

was consulted for further management of CVA


Admission Cardiac Labs: Troponin 0.013, repeat 0.085


Admission testing:


Admission ECG shows sinus rhythm with PACs.  Nonspecific ST changes in inferior 

leads


MRI brain acute/subacute ischemia in left parietal and temporal lobe


2D echo EF 55%, mild aortic stenosis, no significant other valvular dysfunction,

no significant regional wall motion abnormality


CTA head neck, complete occlusion of left M2 segment of MCA, 60 to 70% proximal 

right ICA stenosis, no flow-limiting stenosis in left ICA.  High-grade stenosis 

of left subclavian artery origin due to dense calcific plaque.





1/2/2025


Patient seen and examined.  Patient denies having chest pain no shortness of 

breath.  Patient has been maintained on IV heparin.  No plan for FOX as patient 

is not a candidate for PFO closure.  We will make arrangements for 30-day event 

monitor at the time of discharge.  Blood pressure 160/68, heart rate 43, pulse 

ox 96% on room air.





1/3/2025


Patient seen and examined.  Patient has heart rate in the 30s during the night. 

Patient was on a beta-blocker which we decreased to 12.5 mg yesterday but she 

did not receive that.  We will plan to discontinue the beta-blocker completely. 

Systolic blood pressure running in the 160s and 170s and amlodipine will be 

increased.  Patient will need oral anticoagulation and this is to be addressed 

by neurology and also whether patient should be on aspirin and anticoagulation 

together.





PHYSICAL EXAMINATION:


Neck: Brisk carotid upstroke, no jugular venous distention.


Lungs: Clear to auscultation.


Heart: Regular rate and rhythm, S1-S2, , no murmur or rub.


Abdomen: Soft nontender, positive bowel sounds.


Extremities: No edema, intact distal pulses.





ASSESSMENT:


Acute CVA with expressive aphasia


Prior history of CVA with expressive aphasia


History of CAD status post CABG


Type 2 diabetes, HbA1c 7.5


Dyslipidemia, 


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  Mild right ICA 

disease


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


Ex-tobacco user


PAD with bilateral femoral artery occlusion





PLAN:


No need for transesophageal echocardiogram as it will not .  

She is not a candidate for PFO closure


Recommend 30-day event monitor on discharge, ordered.  


Increase amlodipine 5 mg to twice daily frequency


The need for aspirin and anticoagulation to be addressed by neurology


Recommend outpatient Lexiscan stress test depending on patient's clinical course

to evaluate if LIMA graft is being compromised because of left subclavian 

stenosis noticed on chest CT.





Nurse practitioner note has been reviewed, I agree with documented findings and 

plan of care.  Patient was seen and examined.








Objective





- Vital Signs


Vital signs: 


                                   Vital Signs











Temp  98.1 F   01/03/25 08:40


 


Pulse  44 L  01/03/25 08:40


 


Resp  16   01/03/25 08:40


 


BP  172/69   01/03/25 08:40


 


Pulse Ox  95   01/03/25 08:40


 


FiO2      








                                 Intake & Output











 01/02/25 01/03/25 01/03/25





 18:59 06:59 18:59


 


Intake Total 354.538  


 


Output Total 300 400 


 


Balance 54.538 -400 


 


Weight  64.5 kg 


 


Intake:   


 


  IV 10  


 


    Invasive Line 3 10  


 


  Intake, IV Titration 104.538  





  Amount   


 


    Heparin Sod,Pork in 0.45% 104.538  





    NaCl 25,000 unit In 0.45   





    % NaCl 1 250ml.bag @ 12   





    UNITS/KG/HR 7.98 mls/hr   





    [facility] Rx[facility]   





    [id number]   


 


  Oral 240  


 


Output:   


 


  Urine 300 400 


 


Other:   


 


  Voiding Method Bedside Commode Bedside Commode Bedside Commode





 Diaper Diaper Diaper


 


  # Voids 2  














- Labs


CBC & Chem 7: 


                                 01/03/25 05:49





                                 01/03/25 05:49


Labs: 


                  Abnormal Lab Results - Last 24 Hours (Table)











  01/02/25 01/02/25 01/02/25 Range/Units





  03:59 10:22 11:28 


 


APTT   67.9 H   (22.0-30.0)  sec


 


Chloride     ()  mmol/L


 


Carbon Dioxide     (22-30)  mmol/L


 


BUN     (7-17)  mg/dL


 


Creatinine     (0.52-1.04)  mg/dL


 


Glucose     (74-99)  mg/dL


 


POC Glucose (mg/dL)    221 H  ()  mg/dL


 


Hemoglobin A1c  7.2 H    (<=6.0)  %














  01/02/25 01/02/25 01/03/25 Range/Units





  16:37 20:20 05:49 


 


APTT    65.5 H  (22.0-30.0)  sec


 


Chloride     ()  mmol/L


 


Carbon Dioxide     (22-30)  mmol/L


 


BUN     (7-17)  mg/dL


 


Creatinine     (0.52-1.04)  mg/dL


 


Glucose     (74-99)  mg/dL


 


POC Glucose (mg/dL)  157 H  137 H   ()  mg/dL


 


Hemoglobin A1c     (<=6.0)  %














  01/03/25 01/03/25 Range/Units





  05:49 06:19 


 


APTT    (22.0-30.0)  sec


 


Chloride  109 H   ()  mmol/L


 


Carbon Dioxide  21 L   (22-30)  mmol/L


 


BUN  30 H   (7-17)  mg/dL


 


Creatinine  1.05 H   (0.52-1.04)  mg/dL


 


Glucose  137 H   (74-99)  mg/dL


 


POC Glucose (mg/dL)   160 H  ()  mg/dL


 


Hemoglobin A1c    (<=6.0)  %

## 2025-01-03 NOTE — CT
EXAMINATION TYPE: CT brain wo con

 

DATE OF EXAM: 1/3/2025

 

COMPARISON: 12/27/2024

 

CLINICAL INDICATION: Female, 89 years old with history of rule out bleed since on anticoagulation; PH
H, rule out bleed since on anticoagulation

 

CT DLP: 1109.4 mGycm

Automated exposure control for dose reduction was used.

 

Findings:

 

The ventricles, basal cisterns and sulci over the convexities are moderately to markedly dilated cons
istent with moderate-to-marked generalized atrophy.

 

There is an enlarging area of decreased density involving the left temporal occipital region consiste
nt with a acute to subacute infarct.. There is no acute intra or extra-axial hemorrhage.

 

There is no mass effect or shift of midline structures.

 

The posterior fossa including the brainstem, fourth ventricle and cerebellar pontine angles appear no
rmal.

 

Intraorbital contents appear normal and symmetric.

 

Visualized paranasal sinuses and mastoid air cells are well aerated. 

 

The calvarium is intact.

 

IMPRESSION:

 

1. Moderate to large acute to subacute enlarging infarct involving the left temporal occipital region
. There is no acute bleed or mass effect.

2. Marked generalized atrophy.

 

[facility], Workstation: [facility], 1/3/2025 10:30 AM

## 2025-01-03 NOTE — P.DS
Providers


Date of admission: 


12/27/24 20:26





Attending physician: 


Nilda Avendano MD





Consults: 





                                        





12/27/24 20:34


Consult Physician Urgent 


   Consulting Provider: Ayan Mccarthy


   Consult Reason/Comments: acute cva/hx cva


   Do you want consulting provider notified?: Yes





12/29/24 09:00


Consult Physician Routine 


   Consulting Provider: Lanette Lau


   Consult Reason/Comments: CVA, high-grade stenosis left subclavian artery


   Do you want consulting provider notified?: Yes





12/31/24 16:00


Consult Physician Routine 


   Consulting Provider: Robyn Adair


   Consult Reason/Comments: FOX for concern of embolic cva and 30 day event 

monitor


   Do you want consulting provider notified?: Yes











Primary care physician: 


[facility] Course: 





diagnoses:


-Acute confusion and aphasia with possible right hemiparesis secondary to 

Acute/subacute stroke with MRI showing acute/subacute ischemic foci of the left 

parietal and temporal lobe with embolic phenomena


-Acute DVT of the right lower extremity


-Vascular disease with complete occlusion of the left M2 segment of middle 

cerebral artery, right internal carotid artery stenosis 60 to 70%, and high-

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


-Elevated troponin


-Altered mental status most likely vascular dementia and possible elements of 

the high mild dementia complicated by stroke as above.  Improved


-Hypertension, currently better controlled


-Diabetes mellitus type 2 











Hospital course:


This is an 89-year-old female who was recently admitted with expressive aphasia 

undergoing neurological workup with neuro following.  


Patient presents with aphasia and confusion suspicious for acute stroke, she had

MRI of the brain today showing acute/subacute ischemic foci of the left parietal

and temporal lobe with embolic phenomena is suspected.  I discussed the case w

ith the neurology service.  FOX is recommended and cardiology team were 

consulted.  However cardiologist did not find the patient is a good surgical 

candidate and therefore did not recommend FOX which was canceled


Also patient evaluated by vascular surgeon Dr. Arana for her multiple 

vascular problems and occlusion and he recommended follow-up outpatient to be 

evaluated for possible endarterectomy and other intervention if needed.


As part of the workup ultrasound of the leg showed acute DVT in the right leg 

and patient was started on heparin drip and switch to Eliquis therapeutic dose 

upon discharge.  Patient should take 10 mg twice daily x 7-day and continue 

thereafter as 5 mg twice daily.


Because patient started on Eliquis, she is continued only on aspirin 81 mg by 

upon discharge.  While Brilinta or Plavix was discontinued as there carry more 

risk of bleeding than benefit.  I discussed the case with the patient and son at

bedside that she will be discharged on Eliquis and aspirin with risk including 

but not limited to bleeding explained to them extensively and they verbalized 

understanding


Other than that patient back to baseline


No other new complaint


Patient was cleared for discharge by cardiologist and neurologist and vascular 

surgeon


Problems and management plan were discussed with the patient and he verbalized 

understanding and acceptance


Patient was found stable and can be discharged to nursing home in guarded 

prognosis however he needs follow-up as an outpatient. Patient was instructed to

follow up with PCP within one week and patient agrees we recommend patient 

follow-up with Dr. Arana from vascular surgery in 1 to 2 weeks after 

discharge, to follow-up also with a neurologist  to in 2 weeks after 

discharge


Patient also was instructed to follow-up with cardiologist Dr. Badillo in 1 to 2 

weeks after discharge and she agrees








Physical exam


Gen: patient is a AAOx3, no distress


CVS: S1-S2, RRR, no murmur


Lungs: B/L CTA, no wheezing


Abdomen: soft, no distention, no tenderness, positive bowel sounds


Extremity: no leg edema or induration





Time spent more than 35 minutes











Patient Condition at Discharge: Serious





Plan - Discharge Summary


Discharge Rx Participant: Yes


New Discharge Prescriptions: 


New


   INSULIN ASPART (NovoLOG) [NovoLOG (formulary)] 0 unit SQ ACHS  each


   Pantoprazole [Protonix] 40 mg PO AC-BRKFST  tab


   Apixaban [Eliquis] 5 mg PO AS DIRECTED  tab





Continue


   metFORMIN HCL [Glucophage] 1,000 mg PO BID


   amLODIPine [Norvasc] 5 mg PO DAILY


   Spironolactone [Aldactone] 25 mg PO DAILY


   Aspirin EC [Ecotrin Low Dose] 81 mg PO DAILY


   Ezetimibe [Zetia] 10 mg PO DAILY


   Atorvastatin [Lipitor] 80 mg PO DAILY


   Losartan Potassium [Cozaar] 100 mg PO DAILY





Discontinued


   hydroCHLOROthiazide [Hydrodiuril] 25 mg PO DAILY


   Metoprolol Succinate (ER) [Toprol Xl] 25 mg PO DAILY


Discharge Medication List





Aspirin EC [Ecotrin Low Dose] 81 mg PO DAILY 12/28/24 [History]


Atorvastatin [Lipitor] 80 mg PO DAILY 12/28/24 [History]


Ezetimibe [Zetia] 10 mg PO DAILY 12/28/24 [History]


Losartan Potassium [Cozaar] 100 mg PO DAILY 12/28/24 [History]


Spironolactone [Aldactone] 25 mg PO DAILY 12/28/24 [History]


amLODIPine [Norvasc] 5 mg PO DAILY 12/28/24 [History]


metFORMIN HCL [Glucophage] 1,000 mg PO BID 12/28/24 [History]


Apixaban [Eliquis] 5 mg PO AS DIRECTED  tab 01/03/25 [Rx]


INSULIN ASPART (NovoLOG) [NovoLOG (formulary)] 0 unit SQ ACHS  each 01/03/25 

[Rx]


Pantoprazole [Protonix] 40 mg PO AC-BRKFST  tab 01/03/25 [Rx]








Follow up Appointment(s)/Referral(s): 


Dawood Badillo MD [Medical Doctor] - 2 Weeks (Cardiologist)


Greg Perez DO [Doctor of Osteopathic Medicine] - 1 Week (Vascular 

surgeon)


Zenaida Mcelroy MD [Medical Doctor] - 2 Weeks (Neurologist)


Bentley Lee DO [Doctor of Osteopathic Medicine] - 1-2 days


Activity/Diet/Wound Care/Special Instructions: 


Heart healthy diet








Activity is as tolerated


Discharge Disposition: TRANSFER TO SNF/ECF

## 2025-01-03 NOTE — P.PN
Subjective


Progress Note Date: 01/03/25


I am following-up with patient and per the son who is at bedside, states patient

language is somewhat improving.  New new neurological issues.


Is on heparin drip.  Had repeat CT head and negative for bleed.








Objective





- Vital Signs


Vital signs: 


                                   Vital Signs











Temp  98.1 F   01/03/25 08:40


 


Pulse  41 L  01/03/25 11:15


 


Resp  16   01/03/25 11:15


 


BP  166/62   01/03/25 11:15


 


Pulse Ox  96   01/03/25 11:15


 


FiO2      








                                 Intake & Output











 01/02/25 01/03/25 01/03/25





 18:59 06:59 18:59


 


Intake Total 354.538  


 


Output Total 300 400 


 


Balance 54.538 -400 


 


Weight  64.5 kg 64.5 kg


 


Intake:   


 


  IV 10  


 


    Invasive Line 3 10  


 


  Intake, IV Titration 104.538  





  Amount   


 


    Heparin Sod,Pork in 0.45% 104.538  





    NaCl 25,000 unit In 0.45   





    % NaCl 1 250ml.bag @ 12   





    UNITS/KG/HR 7.98 mls/hr   





    IV .Q24H WILLEM Rx#:   





    [id number]   


 


  Oral 240  


 


Output:   


 


  Urine 300 400 


 


Other:   


 


  Voiding Method Bedside Commode Bedside Commode Bedside Commode





 Diaper Diaper Diaper


 


  # Voids 2  














- Exam


General: Lying in bed and is not in acute distress.


Neuro:


Limited.


Has severe aphasia appears her speech.  She states she was well and correctly 

stated her name.  She acknowledged her son upon asking her about her son.  She 

followed some simple commands by mimicking me such as lifting extremity above 

gravity upper and lower and appears symmetrical.  No facial droop.  No 

dysarthria.





- Labs


CBC & Chem 7: 


                                 01/03/25 05:49





                                 01/03/25 05:49


Labs: 


                  Abnormal Lab Results - Last 24 Hours (Table)











  01/02/25 01/02/25 01/03/25 Range/Units





  16:37 20:20 05:49 


 


APTT    65.5 H  (22.0-30.0)  sec


 


Chloride     ()  mmol/L


 


Carbon Dioxide     (22-30)  mmol/L


 


BUN     (7-17)  mg/dL


 


Creatinine     (0.52-1.04)  mg/dL


 


Glucose     (74-99)  mg/dL


 


POC Glucose (mg/dL)  157 H  137 H   ()  mg/dL














  01/03/25 01/03/25 01/03/25 Range/Units





  05:49 06:19 11:38 


 


APTT     (22.0-30.0)  sec


 


Chloride  109 H    ()  mmol/L


 


Carbon Dioxide  21 L    (22-30)  mmol/L


 


BUN  30 H    (7-17)  mg/dL


 


Creatinine  1.05 H    (0.52-1.04)  mg/dL


 


Glucose  137 H    (74-99)  mg/dL


 


POC Glucose (mg/dL)   160 H  136 H  ()  mg/dL














Assessment and Plan


Assessment: 





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  Patient has acute 

  to subacute stroke over the left parietal and temporal region.  As well as has

  subacute appearing region in the medial aspect of the right frontal etiology 

  unknown but seems embolic in nature.  Rule out any A-fib or flutter.  Patient 

  was not a candidate for TNK because she came outside the window.  Has +ve DVT 

  in right upper and mild right popliteal vein (lower is limited). 


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 

  chronic, as was seen with the previous images 12/16/2022.  


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 

  calcified plaque.


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 

  attempted thrombectomy was was not successful because of bilateral femoral 

  occlusions.


* Mild renal insufficiency


* Elevated cardiac enzymes


* Hyperlipidemia


* Ex tobacco use


* Hypertension 


* Diabetes





Plan: 





* MRI brain is reported as acute/subacute ischemic identified throughout the 

  left parietal and temporal lobe.  Additional more subacute appearing lesion in

  the medial aspect of the right frontal lobe.  Raises concern for possible 

  embolic phenomenon.  Mild hemosiderin deposition within the medial aspect of 

  the right frontal and with left temporal lobe.


* 2-D echo: Read as technically difficult study for agitation.  Normal left 

  ventricle and right ventricle systolic function.  Aortic sclerosis with mild 

  stenosis.


* CTA head and neck showed: Complete occlusion of the left M2 segment of the 

  middle cerebral artery within the sylvian fissure.  This finding could be 

  acute or chronic and overall, appears similar to prior study 12/16/2022.  No 

  evidence of dissection involving the cervical internal carotid arteries.  

  Approximately 60-70% stenosis of the proximal right ICA due to dense calcified

  plaque.  No flow limiting stenosis of the left ICA.  High-grade stenosis of 

  the left subclavian artery origin due to dense calcified plaque.  

  Neurointervention do not recommend any intervention because of patient having 

  chronic femoral occlusions (as per formation from previous admission).


* vascular surgery for high-grade stenosis of the left subclavian artery.  


* Fasting a.m. lipid panel with cholesterol 295, , HDL 72 and 

  triglycerides 80.0.  Continue Lipitor 80 mg and Zetia 10 mg.  Uncertain if 

  patient is compliant, as lipids are poorly controlled.


* Hemoglobin A1c: 7.5


* Venous duplex of the right is reported as positive for DVT at the upper and 

  mild right popliteal vein, extremely limited exam due to combative patient.  

  Left lower extremity no evidence for DVT


* Recommend normotensive blood pressure.


* Venous duplex there are some limitation of the upper extremity that is re

  ported on the other report.


* Repeat CT head today: Moderate to large acute to subacute enlarging infarct 

  involving the left temporal occipital region.  There is no acute bleed or mass

  effect.


* Patient was on aspirin 81 mg daily and was started on Brilinta 90 mg twice 

  daily during this hospital visit.  Patient has positive DVT. She is on Heparin

  drip.   Discontinue Brilinta since increase risk of bleed and continue ASA 

  81mg daily.  From neurological perspective is clear to change heparin drip to 

  Eliquis.  


* cardiology evaluated her and recommended no FOX since they did not feel it 

  will  or is a candidate for PFO closure.


* Recommend a 30 day event monitor.   


* Neuro checks every 4 hours.


* Telemetry monitoring rule out any arrhythmia


* PT, OT, speech therapy


* DVT prophylaxis: Heparin 5000 units subcu every 12 hours


* Upon discharge, recommend the patient to follow-up with neurologist as 

  outpatient.





There is no further neurological work-up.


The plan discussed with the patient son was at bedside, as well as primary 

attending.





Time with Patient: Less than 30